# Patient Record
Sex: MALE | Race: OTHER | NOT HISPANIC OR LATINO
[De-identification: names, ages, dates, MRNs, and addresses within clinical notes are randomized per-mention and may not be internally consistent; named-entity substitution may affect disease eponyms.]

---

## 2017-03-07 PROBLEM — Z00.00 ENCOUNTER FOR PREVENTIVE HEALTH EXAMINATION: Status: ACTIVE | Noted: 2017-03-07

## 2019-09-17 ENCOUNTER — APPOINTMENT (OUTPATIENT)
Dept: OTOLARYNGOLOGY | Facility: CLINIC | Age: 74
End: 2019-09-17
Payer: MEDICARE

## 2019-09-17 VITALS
BODY MASS INDEX: 29.12 KG/M2 | HEIGHT: 72 IN | SYSTOLIC BLOOD PRESSURE: 163 MMHG | WEIGHT: 215 LBS | HEART RATE: 56 BPM | DIASTOLIC BLOOD PRESSURE: 87 MMHG

## 2019-09-17 DIAGNOSIS — R25.9 UNSPECIFIED ABNORMAL INVOLUNTARY MOVEMENTS: ICD-10-CM

## 2019-09-17 DIAGNOSIS — J38.7 OTHER DISEASES OF LARYNX: ICD-10-CM

## 2019-09-17 DIAGNOSIS — R49.1 APHONIA: ICD-10-CM

## 2019-09-17 DIAGNOSIS — A92.30 WEST NILE VIRUS INFECTION, UNSPECIFIED: ICD-10-CM

## 2019-09-17 PROCEDURE — 99204 OFFICE O/P NEW MOD 45 MIN: CPT | Mod: 25

## 2019-09-17 PROCEDURE — 31575 DIAGNOSTIC LARYNGOSCOPY: CPT

## 2019-09-17 NOTE — HISTORY OF PRESENT ILLNESS
[Hoarseness] : hoarseness [de-identified] : Mr. Valentin presents reports going to Dr. Vega a month ago for right ear issues and was noted to have hoarseness in his voice.  He reports that his voice has been hoarse for about 3 years ago.  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  Dr. Vega did a fiber optic exam and noted right posterior true vocal cord exophytic erythematous lesion.  No imaging or biopsies done.  Oneil any pain or discomfort..  pt has also noted R ear discomfort w raising voice. no swalow issues. quit smoking 1978   [Difficulty Swallowing] : no difficulty swallowing [Painful Swallowing] : no painful swallowing [Neck Pain] : no neck pain [Weight Loss] : no weight loss

## 2019-09-17 NOTE — CONSULT LETTER
[Dear  ___] : Dear  [unfilled], [Consult Letter:] : I had the pleasure of evaluating your patient, [unfilled]. [Please see my note below.] : Please see my note below. [Consult Closing:] : Thank you very much for allowing me to participate in the care of this patient.  If you have any questions, please do not hesitate to contact me. [Sincerely,] : Sincerely, [FreeTextEntry2] : Berhane Vega MD ([place], [place])\par  [FreeTextEntry3] : Lewis Rogers MD

## 2019-09-17 NOTE — CONSULT LETTER
[Dear  ___] : Dear  [unfilled], [Consult Letter:] : I had the pleasure of evaluating your patient, [unfilled]. [Please see my note below.] : Please see my note below. [Consult Closing:] : Thank you very much for allowing me to participate in the care of this patient.  If you have any questions, please do not hesitate to contact me. [Sincerely,] : Sincerely, [FreeTextEntry2] : Berhane Vega MD ([place], [place])\par  [FreeTextEntry3] : Lewis Rogers MD

## 2019-09-17 NOTE — PROCEDURE
SUBJECTIVE:   CC: Michael Waters is an 45 year old male who presents for preventative health visit.           Chief Complaint   Patient presents with     Physical                                Healthy Habits:    Do you get at least three servings of calcium containing foods daily (dairy, green leafy vegetables, etc.)? yes    Amount of exercise or daily activities, outside of work: 2 day(s) per week    Problems taking medications regularly No    Medication side effects: No    Have you had an eye exam in the past two years? no    Do you see a dentist twice per year? yes    Do you have sleep apnea, excessive snoring or daytime drowsiness?yes       Today's PHQ-2 Score:   PHQ-2 ( 1999 [employer]) 10/2/2012   Q1: Little interest or pleasure in doing things 0   Q2: Feeling down, depressed or hopeless 0   PHQ-2 Score 0       Abuse: Current or Past(Physical, Sexual or Emotional)- No  Do you feel safe in your environment - Yes    Social History   Substance Use Topics     Smoking status: Never Smoker     Smokeless tobacco: Never Used     Alcohol use Yes      Comment: occ      If you drink alcohol do you typically have >3 drinks per day or >7 drinks per week? No                      Last PSA: No results found for: PSA    Reviewed orders with patient. Reviewed health maintenance and updated orders accordingly - Yes    Reviewed and updated as needed this visit by clinical staff         Reviewed and updated as needed this visit by Provider          ROS:  CONSTITUTIONAL: NEGATIVE for fever, chills, change in weight  INTEGUMENTARY/SKIN: NEGATIVE for worrisome rashes, moles or lesions  EYES: NEGATIVE for vision changes or irritation  ENT: NEGATIVE for ear, mouth and throat problems  RESP: NEGATIVE for significant cough or SOB  CV: NEGATIVE for chest pain, palpitations or peripheral edema  GI: NEGATIVE for nausea, abdominal pain, heartburn, or change in bowel habits   male: negative for dysuria, hematuria, decreased urinary stream,  "erectile dysfunction, urethral discharge  MUSCULOSKELETAL: NEGATIVE for significant arthralgias or myalgia  NEURO: NEGATIVE for weakness, dizziness or paresthesias  PSYCHIATRIC: NEGATIVE for changes in mood or affect    OBJECTIVE:   /84  Pulse 92  Temp 98.4  F (36.9  C) (Tympanic)  Resp 16  Ht 5' 11.75\" (1.822 m)  Wt 215 lb (97.5 kg)  BMI 29.36 kg/m2  EXAM:  GENERAL: healthy, alert and no distress  EYES: Eyes grossly normal to inspection, and conjunctivae and sclerae normal  HENT: ear canals and TM's normal, nose and mouth without ulcers or lesions; visibly normal nares without more invasive examination of mucosal surfaces or vasculature  NECK: no adenopathy, no asymmetry, masses, or scars and thyroid normal to palpation  RESP: lungs clear to auscultation - no rales, rhonchi or wheezes  CV: regular rate and rhythm, normal S1 S2, no S3 or S4, no murmur, click or rub, no peripheral edema and peripheral pulses strong  ABDOMEN: soft, nontender, no hepatosplenomegaly, no masses and bowel sounds normal  MS: no gross musculoskeletal defects noted, no edema except:   synovial cyst of left elbow, mobile, non-tender; & right medial 1st MTP bunion/hypertrophy  SKIN: scaly patches of dry skin with scale on tops of feet bilaterally  NEURO: Normal strength and tone, mentation intact and speech normal  PSYCH: mentation appears normal, mood depressed and anxious, otherwise agreeable      ASSESSMENT/PLAN:     10. Routine general medical examination at a health care facility    3. Lipid screening  - Lipid panel reflex to direct LDL Fasting; Future    4. Screening for diabetes mellitus  - **Glucose FUTURE anytime; Future        COUNSELING:  Reviewed preventive health counseling, as reflected in patient instructions       Regular exercise       Healthy diet/nutrition    BP Readings from Last 1 Encounters:   04/25/18 125/83     Estimated body mass index is 27.97 kg/(m^2) as calculated from the following:    Height as of " "10/17/17: 6' 1\" (1.854 m).    Weight as of 4/25/18: 212 lb (96.2 kg).    BP Screening:   Last 3 BP Readings:    BP Readings from Last 3 Encounters:   08/22/18 130/84   04/25/18 125/83   10/17/17 143/89       The following was recommended to the patient:  Re-screen BP within a year and recommended lifestyle modifications  Weight management plan: Discussed healthy diet and exercise guidelines and patient will follow up in 6 months in clinic to re-evaluate.     reports that he has never smoked. He has never used smokeless tobacco.      Counseling Resources:  ATP IV Guidelines  Pooled Cohorts Equation Calculator  FRAX Risk Assessment  ICSI Preventive Guidelines  Dietary Guidelines for Americans, 2010  USDA's MyPlate  ASA Prophylaxis  Lung CA Screening    Rip Garcia MD  [facility]  " [Hoarseness] : hoarseness not clearly evaluated by indirect laryngoscopy [Lesion] : lesion identified by mirror examination needing further evaluation [Topical Lidocaine] : topical lidocaine [Oxymetazoline HCl] : oxymetazoline HCl [Flexible Endoscope] : examined with the flexible endoscope [Serial Number: ___] : Serial Number: [unfilled] [Normal] : normal vallecula [Lesion(s)] : lesion(s) [de-identified] : see above [de-identified] : subtle rd lesion vs hyperemia R arytenoid process/post glottis\par \par nl TVF motion.

## 2019-09-17 NOTE — PROCEDURE
[Hoarseness] : hoarseness not clearly evaluated by indirect laryngoscopy [Lesion] : lesion identified by mirror examination needing further evaluation [Topical Lidocaine] : topical lidocaine [Oxymetazoline HCl] : oxymetazoline HCl [Flexible Endoscope] : examined with the flexible endoscope [Serial Number: ___] : Serial Number: [unfilled] [Normal] : normal vallecula [Lesion(s)] : lesion(s) [de-identified] : subtle rd lesion vs hyperemia R arytenoid process/post glottis\par \par nl TVF motion.  [de-identified] : see above

## 2019-09-17 NOTE — HISTORY OF PRESENT ILLNESS
[Hoarseness] : hoarseness [de-identified] : Mr. Valentin presents reports going to Dr. Vega a month ago for right ear issues and was noted to have hoarseness in his voice.  He reports that his voice has been hoarse for about 3 years ago.  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  Dr. Vega did a fiber optic exam and noted right posterior true vocal cord exophytic erythematous lesion.  No imaging or biopsies done.  Oneil any pain or discomfort..  pt has also noted R ear discomfort w raising voice. no swalow issues. quit smoking 1978   [Difficulty Swallowing] : no difficulty swallowing [Neck Pain] : no neck pain [Painful Swallowing] : no painful swallowing [Weight Loss] : no weight loss

## 2019-09-27 ENCOUNTER — OUTPATIENT (OUTPATIENT)
Dept: OUTPATIENT SERVICES | Facility: HOSPITAL | Age: 74
LOS: 1 days | End: 2019-09-27
Payer: MEDICARE

## 2019-09-27 VITALS
HEIGHT: 71 IN | OXYGEN SATURATION: 96 % | WEIGHT: 218.04 LBS | SYSTOLIC BLOOD PRESSURE: 138 MMHG | RESPIRATION RATE: 14 BRPM | HEART RATE: 68 BPM | DIASTOLIC BLOOD PRESSURE: 84 MMHG | TEMPERATURE: 97 F

## 2019-09-27 DIAGNOSIS — J38.7 OTHER DISEASES OF LARYNX: ICD-10-CM

## 2019-09-27 DIAGNOSIS — Z01.818 ENCOUNTER FOR OTHER PREPROCEDURAL EXAMINATION: ICD-10-CM

## 2019-09-27 DIAGNOSIS — Z90.89 ACQUIRED ABSENCE OF OTHER ORGANS: Chronic | ICD-10-CM

## 2019-09-27 LAB
ANION GAP SERPL CALC-SCNC: 12 MMO/L — SIGNIFICANT CHANGE UP (ref 7–14)
BUN SERPL-MCNC: 20 MG/DL — SIGNIFICANT CHANGE UP (ref 7–23)
CALCIUM SERPL-MCNC: 9.5 MG/DL — SIGNIFICANT CHANGE UP (ref 8.4–10.5)
CHLORIDE SERPL-SCNC: 104 MMOL/L — SIGNIFICANT CHANGE UP (ref 98–107)
CO2 SERPL-SCNC: 27 MMOL/L — SIGNIFICANT CHANGE UP (ref 22–31)
CREAT SERPL-MCNC: 0.91 MG/DL — SIGNIFICANT CHANGE UP (ref 0.5–1.3)
GLUCOSE SERPL-MCNC: 94 MG/DL — SIGNIFICANT CHANGE UP (ref 70–99)
HCT VFR BLD CALC: 40.9 % — SIGNIFICANT CHANGE UP (ref 39–50)
HGB BLD-MCNC: 13 G/DL — SIGNIFICANT CHANGE UP (ref 13–17)
MCHC RBC-ENTMCNC: 28.7 PG — SIGNIFICANT CHANGE UP (ref 27–34)
MCHC RBC-ENTMCNC: 31.8 % — LOW (ref 32–36)
MCV RBC AUTO: 90.3 FL — SIGNIFICANT CHANGE UP (ref 80–100)
NRBC # FLD: 0 K/UL — SIGNIFICANT CHANGE UP (ref 0–0)
PLATELET # BLD AUTO: 215 K/UL — SIGNIFICANT CHANGE UP (ref 150–400)
PMV BLD: 10.7 FL — SIGNIFICANT CHANGE UP (ref 7–13)
POTASSIUM SERPL-MCNC: 4.2 MMOL/L — SIGNIFICANT CHANGE UP (ref 3.5–5.3)
POTASSIUM SERPL-SCNC: 4.2 MMOL/L — SIGNIFICANT CHANGE UP (ref 3.5–5.3)
RBC # BLD: 4.53 M/UL — SIGNIFICANT CHANGE UP (ref 4.2–5.8)
RBC # FLD: 13.2 % — SIGNIFICANT CHANGE UP (ref 10.3–14.5)
SODIUM SERPL-SCNC: 143 MMOL/L — SIGNIFICANT CHANGE UP (ref 135–145)
WBC # BLD: 8.21 K/UL — SIGNIFICANT CHANGE UP (ref 3.8–10.5)
WBC # FLD AUTO: 8.21 K/UL — SIGNIFICANT CHANGE UP (ref 3.8–10.5)

## 2019-09-27 PROCEDURE — 93010 ELECTROCARDIOGRAM REPORT: CPT

## 2019-09-27 RX ORDER — SODIUM CHLORIDE 9 MG/ML
1000 INJECTION, SOLUTION INTRAVENOUS
Refills: 0 | Status: DISCONTINUED | OUTPATIENT
Start: 2019-10-10 | End: 2019-10-25

## 2019-09-27 NOTE — H&P PST ADULT - NEGATIVE ENMT SYMPTOMS
no hearing difficulty/no sinus symptoms/no post-nasal discharge/no throat pain/no dysphagia/no nasal congestion

## 2019-09-27 NOTE — H&P PST ADULT - NSICDXPROBLEM_GEN_ALL_CORE_FT
PROBLEM DIAGNOSES  Problem: Other diseases of larynx  Assessment and Plan: pt scheduled for direct laryngoscopy with biopsy on 10/10/19  Preop instructions provided. Pt verbalized understanding.   Pepcid for GI prophylaxis with written and verbal instruction provided   med eval pending on 10/01/19 as per surgeon request, copy requested

## 2019-09-27 NOTE — H&P PST ADULT - NEGATIVE GENERAL GENITOURINARY SYMPTOMS
no flank pain L/no flank pain R/no bladder infections/no dysuria/no renal colic/no incontinence/no hematuria

## 2019-09-27 NOTE — H&P PST ADULT - HISTORY OF PRESENT ILLNESS
74 y.o. male with hx of west nile fever 3 yeas ago, reports hx of right ear pain, s/p ENT eval, referred to Dr. Rogers, reports vocal cord lesion, c/o hoarseness x 3 years, denies dysphagia, weight loss or fatigue, presents to PST for evaluation for Direct laryngoscopy with Biopsy on 10/10/19 74 y.o. male with hx of west nile fever 3 yeas ago, c/o hoarseness x 3 years, denies dysphagia, weight loss or fatigue, reports right ear pain, s/p ENT eval, vocal cord lesion noted, presents to PST for evaluation for Direct laryngoscopy with Biopsy on 10/10/19

## 2019-09-27 NOTE — H&P PST ADULT - NSANTHOSAYNRD_GEN_A_CORE
No. JOHNSON screening performed.  STOP BANG Legend: 0-2 = LOW Risk; 3-4 = INTERMEDIATE Risk; 5-8 = HIGH Risk

## 2019-10-09 ENCOUNTER — TRANSCRIPTION ENCOUNTER (OUTPATIENT)
Age: 74
End: 2019-10-09

## 2019-10-10 ENCOUNTER — APPOINTMENT (OUTPATIENT)
Dept: OTOLARYNGOLOGY | Facility: HOSPITAL | Age: 74
End: 2019-10-10

## 2019-10-10 ENCOUNTER — OUTPATIENT (OUTPATIENT)
Dept: OUTPATIENT SERVICES | Facility: HOSPITAL | Age: 74
LOS: 1 days | Discharge: HOME | End: 2019-10-10
Payer: MEDICARE

## 2019-10-10 ENCOUNTER — RESULT REVIEW (OUTPATIENT)
Age: 74
End: 2019-10-10

## 2019-10-10 VITALS
HEIGHT: 71 IN | RESPIRATION RATE: 15 BRPM | DIASTOLIC BLOOD PRESSURE: 82 MMHG | HEART RATE: 64 BPM | WEIGHT: 218.04 LBS | TEMPERATURE: 99 F | OXYGEN SATURATION: 98 % | SYSTOLIC BLOOD PRESSURE: 133 MMHG

## 2019-10-10 VITALS
SYSTOLIC BLOOD PRESSURE: 142 MMHG | DIASTOLIC BLOOD PRESSURE: 80 MMHG | HEART RATE: 80 BPM | RESPIRATION RATE: 16 BRPM | OXYGEN SATURATION: 98 %

## 2019-10-10 DIAGNOSIS — J38.7 OTHER DISEASES OF LARYNX: ICD-10-CM

## 2019-10-10 DIAGNOSIS — Z90.89 ACQUIRED ABSENCE OF OTHER ORGANS: Chronic | ICD-10-CM

## 2019-10-10 PROCEDURE — 31536 LARYNGOSCOPY W/BX & OP SCOPE: CPT | Mod: GC,RT

## 2019-10-10 PROCEDURE — 88331 PATH CONSLTJ SURG 1 BLK 1SPC: CPT | Mod: 26

## 2019-10-10 PROCEDURE — 88305 TISSUE EXAM BY PATHOLOGIST: CPT | Mod: 26

## 2019-10-10 RX ORDER — FENTANYL CITRATE 50 UG/ML
25 INJECTION INTRAVENOUS
Refills: 0 | Status: DISCONTINUED | OUTPATIENT
Start: 2019-10-10 | End: 2019-10-10

## 2019-10-10 RX ORDER — FAMOTIDINE 10 MG/ML
1 INJECTION INTRAVENOUS
Qty: 30 | Refills: 1
Start: 2019-10-10

## 2019-10-10 RX ORDER — OXYCODONE HYDROCHLORIDE 5 MG/1
5 TABLET ORAL ONCE
Refills: 0 | Status: DISCONTINUED | OUTPATIENT
Start: 2019-10-10 | End: 2019-10-10

## 2019-10-10 RX ORDER — ONDANSETRON 8 MG/1
4 TABLET, FILM COATED ORAL ONCE
Refills: 0 | Status: DISCONTINUED | OUTPATIENT
Start: 2019-10-10 | End: 2019-10-25

## 2019-10-10 RX ADMIN — OXYCODONE HYDROCHLORIDE 5 MILLIGRAM(S): 5 TABLET ORAL at 22:50

## 2019-10-10 RX ADMIN — FENTANYL CITRATE 25 MICROGRAM(S): 50 INJECTION INTRAVENOUS at 21:30

## 2019-10-10 RX ADMIN — FENTANYL CITRATE 25 MICROGRAM(S): 50 INJECTION INTRAVENOUS at 21:13

## 2019-10-10 RX ADMIN — OXYCODONE HYDROCHLORIDE 5 MILLIGRAM(S): 5 TABLET ORAL at 22:19

## 2019-10-10 NOTE — ASU DISCHARGE PLAN (ADULT/PEDIATRIC) - SPECIFY DIET AND FLUID
soft diet, warm water soft diet, warm water  Do not take pain medication on an empty stomach.  Increase fluids and fiber in diet to prevent constipation.

## 2019-10-10 NOTE — ASU PREOP CHECKLIST - COMMENTS
Took Pepcid this am. Took Pepcid this am., patient took 1 cup of coffee with half/half at 8am. Dr Lee was notified.

## 2019-10-10 NOTE — ASU DISCHARGE PLAN (ADULT/PEDIATRIC) - NURSING INSTRUCTIONS
Cool and warm liquids that are not irritating to the throat should be given for the first day or two. Avoid hot liquids. Avoid citrus juices and milk. Advance at your own pace starting with soft foods and advancing to a regular diet. Avoid rough and scratchy foods and foods that are difficult to chew for approximately 5 days. Avoid strenous exercise and blowing of nose.    When taking pain meds - take with food and know it may cause constipation and nausea - Do NOT drive while on narcotics.

## 2019-10-10 NOTE — BRIEF OPERATIVE NOTE - OPERATION/FINDINGS
s/p direct laryngoscopy with biopsy of right vocal cord lesion  lesion filling right vestibule, not involving anterior commissure or false cord

## 2019-10-10 NOTE — ASU DISCHARGE PLAN (ADULT/PEDIATRIC) - CARE PROVIDER_API CALL
Lewis Rogers)  [facility]; Otolaryngology  [address] [place] [id number]  Phone: [phone]  Fax: [phone]  Follow Up Time:

## 2019-10-14 LAB — SURGICAL PATHOLOGY STUDY: SIGNIFICANT CHANGE UP

## 2019-10-16 PROBLEM — A92.30 WEST NILE VIRUS INFECTION, UNSPECIFIED: Chronic | Status: ACTIVE | Noted: 2019-09-27

## 2019-10-16 PROBLEM — R25.9 UNSPECIFIED ABNORMAL INVOLUNTARY MOVEMENTS: Chronic | Status: ACTIVE | Noted: 2019-09-27

## 2019-10-16 PROBLEM — J38.7 OTHER DISEASES OF LARYNX: Chronic | Status: ACTIVE | Noted: 2019-09-27

## 2019-10-17 ENCOUNTER — FORM ENCOUNTER (OUTPATIENT)
Age: 74
End: 2019-10-17

## 2019-10-18 ENCOUNTER — OUTPATIENT (OUTPATIENT)
Dept: OUTPATIENT SERVICES | Facility: HOSPITAL | Age: 74
LOS: 1 days | End: 2019-10-18
Payer: MEDICARE

## 2019-10-18 ENCOUNTER — APPOINTMENT (OUTPATIENT)
Dept: CT IMAGING | Facility: CLINIC | Age: 74
End: 2019-10-18
Payer: MEDICARE

## 2019-10-18 DIAGNOSIS — Z90.89 ACQUIRED ABSENCE OF OTHER ORGANS: Chronic | ICD-10-CM

## 2019-10-18 DIAGNOSIS — C32.9 MALIGNANT NEOPLASM OF LARYNX, UNSPECIFIED: ICD-10-CM

## 2019-10-18 PROCEDURE — 70491 CT SOFT TISSUE NECK W/DYE: CPT | Mod: 26

## 2019-10-18 PROCEDURE — 70491 CT SOFT TISSUE NECK W/DYE: CPT

## 2019-10-21 ENCOUNTER — OUTPATIENT (OUTPATIENT)
Dept: OUTPATIENT SERVICES | Facility: HOSPITAL | Age: 74
LOS: 1 days | Discharge: HOME | End: 2019-10-21
Payer: MEDICARE

## 2019-10-21 DIAGNOSIS — Z90.89 ACQUIRED ABSENCE OF OTHER ORGANS: Chronic | ICD-10-CM

## 2019-10-22 ENCOUNTER — APPOINTMENT (OUTPATIENT)
Dept: RADIATION ONCOLOGY | Facility: CLINIC | Age: 74
End: 2019-10-22
Payer: MEDICARE

## 2019-10-22 ENCOUNTER — APPOINTMENT (OUTPATIENT)
Dept: OTOLARYNGOLOGY | Facility: CLINIC | Age: 74
End: 2019-10-22
Payer: MEDICARE

## 2019-10-22 VITALS
WEIGHT: 217 LBS | OXYGEN SATURATION: 100 % | HEART RATE: 62 BPM | RESPIRATION RATE: 16 BRPM | BODY MASS INDEX: 29.39 KG/M2 | TEMPERATURE: 36.7 F | SYSTOLIC BLOOD PRESSURE: 136 MMHG | DIASTOLIC BLOOD PRESSURE: 73 MMHG | HEIGHT: 72 IN

## 2019-10-22 VITALS
DIASTOLIC BLOOD PRESSURE: 76 MMHG | HEART RATE: 78 BPM | BODY MASS INDEX: 29.12 KG/M2 | RESPIRATION RATE: 16 BRPM | SYSTOLIC BLOOD PRESSURE: 115 MMHG | WEIGHT: 215 LBS | HEIGHT: 72 IN

## 2019-10-22 DIAGNOSIS — Z87.891 PERSONAL HISTORY OF NICOTINE DEPENDENCE: ICD-10-CM

## 2019-10-22 PROCEDURE — 99214 OFFICE O/P EST MOD 30 MIN: CPT | Mod: 25

## 2019-10-22 PROCEDURE — 31575 DIAGNOSTIC LARYNGOSCOPY: CPT

## 2019-10-22 PROCEDURE — 99204 OFFICE O/P NEW MOD 45 MIN: CPT | Mod: 25

## 2019-10-22 PROCEDURE — 77263 THER RADIOLOGY TX PLNG CPLX: CPT

## 2019-10-22 NOTE — PHYSICAL EXAM
[Midline] : trachea located in midline position [Normal] : no rashes [FreeTextEntry1] : Hoarse voice [Laryngoscopy Performed] : laryngoscopy was performed, see procedure section for findings

## 2019-10-22 NOTE — PROCEDURE
[Unable to Cooperate with Mirror] : patient unable to cooperate with mirror [Hoarseness] : hoarseness not clearly evaluated by indirect laryngoscopy [Topical Lidocaine] : topical lidocaine [Lesion] : lesion identified by mirror examination needing further evaluation [Oxymetazoline HCl] : oxymetazoline HCl [Flexible Endoscope] : examined with the flexible endoscope [Serial Number: ___] : Serial Number: [unfilled] [Lesion(s)] : lesion(s) [True Vocal Cords Paralysis] : no true vocal cord paralysis [True Vocal Cords Erythematous] : true vocal cord edema on the right [Glottis Arytenoid Cartilages] : no arytenoid granulomas [Glottis Arytenoid Cartilages Erythema] : no arytenoid erythema [Normal] : posterior cricoid area had healthy pink mucosa in the interarytenoid area and the esophageal inlet [Present] : absent [de-identified] : R ventricle lesion with thickening of the RVC

## 2019-10-22 NOTE — REASON FOR VISIT
[Head and Neck Cancer] : head and neck cancer [Consideration of Curative Therapy] : consideration of curative therapy for

## 2019-10-22 NOTE — HISTORY OF PRESENT ILLNESS
[de-identified] : Mr. Valentin is a 74 year old male s/p laryngeal biopsy for SCCa glottis with Dr. Rogers on October 10, 2019.  He reports that his voice has somewhat improved since he was last seen in our office prior to the biopsy.  He presents for possible robotic surgery.  Denies any pain, discomfort, or dysphagia. [Neck Mass] : no neck mass [Difficulty Swallowing] : no difficulty swallowing [Painful Swallowing] : no painful swallowing

## 2019-10-22 NOTE — ASSESSMENT
[FreeTextEntry1] : While pt's lesion appears to be resectable robotically, it would require at least a Type III r vocal cordectomy.  His voice outcome after a Type III cordectomy would be inferior to the voice outcome with radiation.  Pt to proceed with the evaluation with radiation therapy later today.

## 2019-10-23 NOTE — HISTORY OF PRESENT ILLNESS
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 \par \par c/o voice change, then saw ENT physician. Biopsy pathology in 10/2019 showed glottis SCC. Denies dysphagia, neck pain, dry mouth. \par \par CT scan in 10/2019 IMPRESSION: \par A poorly defined enhancing lesion involves the posterior aspect of the right true vocal cord. There is no subglottic extension or extralaryngeal spread of disease. The overlying thyroid cartilage and overlying strap musculature is unremarkable. \par No evidence for enlarged cervical lymph nodes. \par \par Side effects of radiation therapy to the head and neck were discussed with patient, including but not limited to skin reaction, fatigue, difficulty swallowing, feeding tube dependent, change in appetite and taste, nausea, bone marrow suppression, cough or shortness of breath. Patient is referred to speech and swallowing evaluation, nutritionist and .

## 2019-10-23 NOTE — PHYSICAL EXAM
[Normal] : oriented to person, place and time, the affect was normal, the mood was normal and not anxious [de-identified] : FOE: R VC lesion, mobile

## 2019-11-01 PROCEDURE — 77334 RADIATION TREATMENT AID(S): CPT | Mod: 26

## 2019-11-14 ENCOUNTER — APPOINTMENT (OUTPATIENT)
Dept: SPEECH THERAPY | Facility: CLINIC | Age: 74
End: 2019-11-14
Payer: MEDICARE

## 2019-11-14 PROCEDURE — 92610 EVALUATE SWALLOWING FUNCTION: CPT | Mod: GN

## 2019-12-03 ENCOUNTER — FORM ENCOUNTER (OUTPATIENT)
Age: 74
End: 2019-12-03

## 2019-12-03 PROCEDURE — 77301 RADIOTHERAPY DOSE PLAN IMRT: CPT | Mod: 26

## 2019-12-03 PROCEDURE — 77300 RADIATION THERAPY DOSE PLAN: CPT | Mod: 26

## 2019-12-03 PROCEDURE — 77338 DESIGN MLC DEVICE FOR IMRT: CPT | Mod: 26

## 2019-12-04 ENCOUNTER — APPOINTMENT (OUTPATIENT)
Dept: MRI IMAGING | Facility: IMAGING CENTER | Age: 74
End: 2019-12-04
Payer: MEDICARE

## 2019-12-04 ENCOUNTER — OUTPATIENT (OUTPATIENT)
Dept: OUTPATIENT SERVICES | Facility: HOSPITAL | Age: 74
LOS: 1 days | End: 2019-12-04
Payer: MEDICARE

## 2019-12-04 DIAGNOSIS — C32.9 MALIGNANT NEOPLASM OF LARYNX, UNSPECIFIED: ICD-10-CM

## 2019-12-04 DIAGNOSIS — Z90.89 ACQUIRED ABSENCE OF OTHER ORGANS: Chronic | ICD-10-CM

## 2019-12-04 PROCEDURE — 70543 MRI ORBT/FAC/NCK W/O &W/DYE: CPT | Mod: 26

## 2019-12-04 PROCEDURE — 70543 MRI ORBT/FAC/NCK W/O &W/DYE: CPT

## 2019-12-04 PROCEDURE — A9585: CPT

## 2019-12-09 PROCEDURE — 77427 RADIATION TX MANAGEMENT X5: CPT

## 2019-12-09 PROCEDURE — 77387B: CUSTOM | Mod: 26

## 2019-12-10 PROCEDURE — 77387B: CUSTOM | Mod: 26

## 2019-12-11 VITALS — RESPIRATION RATE: 16 BRPM | BODY MASS INDEX: 29.39 KG/M2 | HEIGHT: 72 IN | WEIGHT: 217 LBS

## 2019-12-11 PROCEDURE — 77387B: CUSTOM | Mod: 26

## 2019-12-11 NOTE — REVIEW OF SYSTEMS
[Dysphagia: Grade 0] : Dysphagia: Grade 0 [Tinnitus - Grade 0] : Tinnitus - Grade 0 [Blurred Vision: Grade 0] : Blurred Vision: Grade 0 [Mucositis Oral: Grade 0] : Mucositis Oral: Grade 0  [Xerostomia: Grade 0] : Xerostomia: Grade 0 [Oral Pain: Grade 0] : Oral Pain: Grade 0 [Salivary duct inflammation: Grade 0] : Salivary duct inflammation: Grade 0 [Dysgeusia: Grade 0] : Dysgeusia: Grade 0 [Alopecia: Grade 0] : Alopecia: Grade 0 [Pruritus: Grade 0] : Pruritus: Grade 0 [Skin Atrophy: Grade 0] : Skin Atrophy: Grade 0 [Skin Hyperpigmentation: Grade 0] : Skin Hyperpigmentation: Grade 0 [Skin Induration: Grade 0] : Skin Induration: Grade 0 [Dermatitis Radiation: Grade 0] : Dermatitis Radiation: Grade 0

## 2019-12-12 PROCEDURE — 77387B: CUSTOM | Mod: 26

## 2019-12-13 PROCEDURE — 77014: CPT | Mod: 26

## 2019-12-16 PROCEDURE — 77427 RADIATION TX MANAGEMENT X5: CPT

## 2019-12-16 PROCEDURE — 77387B: CUSTOM | Mod: 26

## 2019-12-17 PROCEDURE — 77387B: CUSTOM | Mod: 26

## 2019-12-18 PROCEDURE — 77387B: CUSTOM | Mod: 26

## 2019-12-19 VITALS
DIASTOLIC BLOOD PRESSURE: 85 MMHG | TEMPERATURE: 97.7 F | WEIGHT: 224.1 LBS | OXYGEN SATURATION: 100 % | RESPIRATION RATE: 16 BRPM | HEART RATE: 60 BPM | HEIGHT: 72 IN | SYSTOLIC BLOOD PRESSURE: 162 MMHG | BODY MASS INDEX: 30.35 KG/M2

## 2019-12-19 PROCEDURE — 77387B: CUSTOM | Mod: 26

## 2019-12-19 NOTE — REASON FOR VISIT
[Routine On-Treatment] : a routine on-treatment visit for [Head and Neck Cancer] : head and neck cancer Multiple episodes of SOB occurring at rest, spontaneous onset and resolution./intermittent intermittent

## 2019-12-20 LAB
ALBUMIN SERPL ELPH-MCNC: 4.2 G/DL
ALP BLD-CCNC: 66 U/L
ALT SERPL-CCNC: 7 U/L
ANION GAP SERPL CALC-SCNC: 11 MMOL/L
AST SERPL-CCNC: 13 U/L
BASOPHILS # BLD AUTO: 0.06 K/UL
BASOPHILS NFR BLD AUTO: 0.8 %
BILIRUB SERPL-MCNC: 0.3 MG/DL
BUN SERPL-MCNC: 22 MG/DL
CALCIUM SERPL-MCNC: 9.2 MG/DL
CHLORIDE SERPL-SCNC: 102 MMOL/L
CO2 SERPL-SCNC: 28 MMOL/L
CREAT SERPL-MCNC: 1 MG/DL
EOSINOPHIL # BLD AUTO: 0.28 K/UL
EOSINOPHIL NFR BLD AUTO: 3.5 %
GLUCOSE SERPL-MCNC: 112 MG/DL
HCT VFR BLD CALC: 37.7 %
HGB BLD-MCNC: 12.1 G/DL
IMM GRANULOCYTES NFR BLD AUTO: 0.4 %
LYMPHOCYTES # BLD AUTO: 1.8 K/UL
LYMPHOCYTES NFR BLD AUTO: 22.7 %
MAN DIFF?: NORMAL
MCHC RBC-ENTMCNC: 29.4 PG
MCHC RBC-ENTMCNC: 32.1 GM/DL
MCV RBC AUTO: 91.5 FL
MONOCYTES # BLD AUTO: 0.57 K/UL
MONOCYTES NFR BLD AUTO: 7.2 %
NEUTROPHILS # BLD AUTO: 5.18 K/UL
NEUTROPHILS NFR BLD AUTO: 65.4 %
PLATELET # BLD AUTO: 251 K/UL
POTASSIUM SERPL-SCNC: 4.3 MMOL/L
PROT SERPL-MCNC: 6.7 G/DL
RBC # BLD: 4.12 M/UL
RBC # FLD: 12.8 %
SODIUM SERPL-SCNC: 141 MMOL/L
WBC # FLD AUTO: 7.92 K/UL

## 2019-12-20 PROCEDURE — 77014: CPT | Mod: 26

## 2019-12-20 NOTE — HISTORY OF PRESENT ILLNESS
[FreeTextEntry1] : 75 y/o male with h/o Parkinson disease, newly diagnosed glottis SCC presents for consideration of radiation treatment. \par \par c/o voice change, then saw ENT physician. Biopsy pathology in 10/2019 showed glottis SCC. \par \par 3/28 fractions of RT today. Denies dysphagia, neck pain, dry mouth. No chemo

## 2019-12-20 NOTE — DISEASE MANAGEMENT
[Clinical] : TNM Stage: c [I] : I [TTNM] : 1 [NTNM] : 0 [de-identified] : 7070 cGy [MTNM] : 0 [de-identified] : glottis

## 2019-12-23 PROCEDURE — 77427 RADIATION TX MANAGEMENT X5: CPT

## 2019-12-23 PROCEDURE — 77387B: CUSTOM | Mod: 26

## 2019-12-24 PROCEDURE — 77387B: CUSTOM | Mod: 26

## 2019-12-26 VITALS — BODY MASS INDEX: 30.43 KG/M2 | WEIGHT: 224.65 LBS | HEIGHT: 72 IN | RESPIRATION RATE: 16 BRPM

## 2019-12-26 PROCEDURE — 77387B: CUSTOM | Mod: 26

## 2019-12-26 NOTE — REVIEW OF SYSTEMS
[Dysphagia: Grade 0] : Dysphagia: Grade 0 [Tinnitus - Grade 0] : Tinnitus - Grade 0 [Blurred Vision: Grade 0] : Blurred Vision: Grade 0 [Mucositis Oral: Grade 0] : Mucositis Oral: Grade 0  [Oral Pain: Grade 0] : Oral Pain: Grade 0 [Xerostomia: Grade 0] : Xerostomia: Grade 0 [Salivary duct inflammation: Grade 0] : Salivary duct inflammation: Grade 0 [Dysgeusia: Grade 0] : Dysgeusia: Grade 0 [Pruritus: Grade 0] : Pruritus: Grade 0 [Alopecia: Grade 0] : Alopecia: Grade 0 [Skin Induration: Grade 0] : Skin Induration: Grade 0 [Skin Hyperpigmentation: Grade 0] : Skin Hyperpigmentation: Grade 0 [Skin Atrophy: Grade 0] : Skin Atrophy: Grade 0 [Dermatitis Radiation: Grade 0] : Dermatitis Radiation: Grade 0

## 2019-12-27 LAB
ALBUMIN SERPL ELPH-MCNC: 4.3 G/DL
ALP BLD-CCNC: 78 U/L
ALT SERPL-CCNC: 12 U/L
ANION GAP SERPL CALC-SCNC: 11 MMOL/L
AST SERPL-CCNC: 17 U/L
BASOPHILS # BLD AUTO: 0.06 K/UL
BASOPHILS NFR BLD AUTO: 0.5 %
BILIRUB SERPL-MCNC: 0.3 MG/DL
BUN SERPL-MCNC: 24 MG/DL
CALCIUM SERPL-MCNC: 9.2 MG/DL
CHLORIDE SERPL-SCNC: 102 MMOL/L
CO2 SERPL-SCNC: 27 MMOL/L
CREAT SERPL-MCNC: 1.01 MG/DL
EOSINOPHIL # BLD AUTO: 0.32 K/UL
EOSINOPHIL NFR BLD AUTO: 2.9 %
GLUCOSE SERPL-MCNC: 100 MG/DL
HCT VFR BLD CALC: 38.4 %
HGB BLD-MCNC: 12.3 G/DL
IMM GRANULOCYTES NFR BLD AUTO: 0.5 %
LYMPHOCYTES # BLD AUTO: 1.93 K/UL
LYMPHOCYTES NFR BLD AUTO: 17.6 %
MAN DIFF?: NORMAL
MCHC RBC-ENTMCNC: 29.2 PG
MCHC RBC-ENTMCNC: 32 GM/DL
MCV RBC AUTO: 91.2 FL
MONOCYTES # BLD AUTO: 0.8 K/UL
MONOCYTES NFR BLD AUTO: 7.3 %
NEUTROPHILS # BLD AUTO: 7.81 K/UL
NEUTROPHILS NFR BLD AUTO: 71.2 %
PLATELET # BLD AUTO: 230 K/UL
POTASSIUM SERPL-SCNC: 5.1 MMOL/L
PROT SERPL-MCNC: 6.9 G/DL
RBC # BLD: 4.21 M/UL
RBC # FLD: 12.7 %
SODIUM SERPL-SCNC: 140 MMOL/L
WBC # FLD AUTO: 10.97 K/UL

## 2019-12-27 PROCEDURE — 77014: CPT | Mod: 26

## 2019-12-27 NOTE — HISTORY OF PRESENT ILLNESS
[FreeTextEntry1] : 75 y/o male with h/o Parkinson disease, newly diagnosed glottis SCC presents for consideration of radiation treatment. \par \par c/o voice change, then saw ENT physician. Biopsy pathology in 10/2019 showed glottis SCC. \par \par 9/28 fractions of RT today. Denies dysphagia, neck pain, dry mouth. No chemo. Blood test every 7 - 10 days.

## 2019-12-27 NOTE — DISEASE MANAGEMENT
[Clinical] : TNM Stage: c [I] : I [TTNM] : 1 [NTNM] : 0 [MTNM] : 0 [de-identified] : glottis [de-identified] : 1590 cGy

## 2019-12-30 VITALS
TEMPERATURE: 98.6 F | RESPIRATION RATE: 16 BRPM | HEART RATE: 60 BPM | OXYGEN SATURATION: 100 % | HEIGHT: 72 IN | DIASTOLIC BLOOD PRESSURE: 85 MMHG | SYSTOLIC BLOOD PRESSURE: 148 MMHG

## 2019-12-30 PROCEDURE — 77387B: CUSTOM | Mod: 26

## 2019-12-30 NOTE — REVIEW OF SYSTEMS
[Dysphagia: Grade 0] : Dysphagia: Grade 0 [Tinnitus - Grade 0] : Tinnitus - Grade 0 [Mucositis Oral: Grade 0] : Mucositis Oral: Grade 0  [Blurred Vision: Grade 0] : Blurred Vision: Grade 0 [Xerostomia: Grade 0] : Xerostomia: Grade 0 [Oral Pain: Grade 0] : Oral Pain: Grade 0 [Salivary duct inflammation: Grade 0] : Salivary duct inflammation: Grade 0 [Alopecia: Grade 0] : Alopecia: Grade 0 [Dysgeusia: Grade 0] : Dysgeusia: Grade 0 [Pruritus: Grade 0] : Pruritus: Grade 0 [Skin Atrophy: Grade 0] : Skin Atrophy: Grade 0 [Skin Hyperpigmentation: Grade 1 - Hyperpigmentation covering <10% BSA; no psychosocial impact] : Skin Hyperpigmentation: Grade 1 - Hyperpigmentation covering <10% BSA; no psychosocial impact [Skin Induration: Grade 0] : Skin Induration: Grade 0 [Dermatitis Radiation: Grade 1 - Faint erythema or dry desquamation] : Dermatitis Radiation: Grade 1 - Faint erythema or dry desquamation

## 2019-12-31 ENCOUNTER — OTHER (OUTPATIENT)
Age: 74
End: 2019-12-31

## 2019-12-31 PROCEDURE — 77387B: CUSTOM | Mod: 26

## 2020-01-02 PROCEDURE — 77427 RADIATION TX MANAGEMENT X5: CPT

## 2020-01-02 PROCEDURE — 77387B: CUSTOM | Mod: 26

## 2020-01-03 PROCEDURE — 77014: CPT | Mod: 26

## 2020-01-06 PROCEDURE — 77387B: CUSTOM | Mod: 26

## 2020-01-07 PROCEDURE — 77387B: CUSTOM | Mod: 26

## 2020-01-08 PROCEDURE — 77387B: CUSTOM | Mod: 26

## 2020-01-08 NOTE — HISTORY OF PRESENT ILLNESS
[FreeTextEntry1] : 73 y/o male with h/o Parkinson disease, newly diagnosed glottis SCC presents for consideration of radiation treatment. \par \par c/o voice change, then saw ENT physician. Biopsy pathology in 10/2019 showed glottis SCC. \par \par 13/28 fractions of RT today. Reports odynophagia. and mild dysphagia,.  Denies neck pain, dry mouth. No chemo. Blood test will be drawn today.\par \par want to hold of on trying magic mouth wash. will use aloe vera

## 2020-01-08 NOTE — DISEASE MANAGEMENT
[Clinical] : TNM Stage: c [I] : I [TTNM] : 1 [MTNM] : 0 [NTNM] : 0 [de-identified] : glottis [de-identified] : 0180 cGy

## 2020-01-08 NOTE — DISEASE MANAGEMENT
[Clinical] : TNM Stage: c [I] : I [TTNM] : 1 [NTNM] : 0 [MTNM] : 0 [de-identified] : 0520 cGy [de-identified] : glottis

## 2020-01-08 NOTE — PHYSICAL EXAM
[Normal] : the ears, nose and oropharynx were normal in appearance [de-identified] : grade 1 dermatitis bilateral neck

## 2020-01-08 NOTE — HISTORY OF PRESENT ILLNESS
[FreeTextEntry1] : 75 y/o male with h/o Parkinson disease, newly diagnosed glottis SCC presents for radiation treatment. \par \par c/o voice change, then saw ENT physician. Biopsy pathology in 10/2019 showed glottis SCC. \par \par 15/28 fractions of RT today. Reports odynophagia. and mild dysphagia,.  Denies neck pain, dry mouth. No chemo. Blood test will be monitored weekly\par \par Pt wants to hold of on trying magic mouth wash. will use aloe vera

## 2020-01-09 VITALS — RESPIRATION RATE: 16 BRPM | BODY MASS INDEX: 30.4 KG/M2 | WEIGHT: 224.43 LBS | HEIGHT: 72 IN

## 2020-01-09 PROCEDURE — 77427 RADIATION TX MANAGEMENT X5: CPT

## 2020-01-09 PROCEDURE — 77387B: CUSTOM | Mod: 26

## 2020-01-09 NOTE — VITALS
[Maximal Pain Intensity: 0/10] : 0/10 [80: Normal activity with effort; some signs or symptoms of disease.] : 80: Normal activity with effort; some signs or symptoms of disease.  [Least Pain Intensity: 0/10] : 0/10 [ECOG Performance Status: 2 - Ambulatory and capable of all self care but unable to carry out any work activities] : Performance Status: 2 - Ambulatory and capable of all self care but unable to carry out any work activities. Up and about more than 50% of waking hours

## 2020-01-09 NOTE — REVIEW OF SYSTEMS
[Dysphagia: Grade 0] : Dysphagia: Grade 0 [Blurred Vision: Grade 0] : Blurred Vision: Grade 0 [Tinnitus - Grade 0] : Tinnitus - Grade 0 [Xerostomia: Grade 0] : Xerostomia: Grade 0 [Mucositis Oral: Grade 0] : Mucositis Oral: Grade 0  [Oral Pain: Grade 0] : Oral Pain: Grade 0 [Salivary duct inflammation: Grade 0] : Salivary duct inflammation: Grade 0 [Dysgeusia: Grade 0] : Dysgeusia: Grade 0 [Alopecia: Grade 0] : Alopecia: Grade 0 [Pruritus: Grade 0] : Pruritus: Grade 0 [Skin Hyperpigmentation: Grade 1 - Hyperpigmentation covering <10% BSA; no psychosocial impact] : Skin Hyperpigmentation: Grade 1 - Hyperpigmentation covering <10% BSA; no psychosocial impact [Skin Atrophy: Grade 0] : Skin Atrophy: Grade 0 [Dermatitis Radiation: Grade 1 - Faint erythema or dry desquamation] : Dermatitis Radiation: Grade 1 - Faint erythema or dry desquamation [Skin Induration: Grade 0] : Skin Induration: Grade 0

## 2020-01-10 PROCEDURE — 77014: CPT | Mod: 26

## 2020-01-13 PROCEDURE — 77387B: CUSTOM | Mod: 26

## 2020-01-14 PROCEDURE — 77387B: CUSTOM | Mod: 26

## 2020-01-15 PROCEDURE — 77387B: CUSTOM | Mod: 26

## 2020-01-16 VITALS — RESPIRATION RATE: 16 BRPM | WEIGHT: 215 LBS | BODY MASS INDEX: 29.12 KG/M2 | HEIGHT: 72 IN

## 2020-01-16 PROCEDURE — 77387B: CUSTOM | Mod: 26

## 2020-01-16 NOTE — REVIEW OF SYSTEMS
[Tinnitus - Grade 0] : Tinnitus - Grade 0 [Dysphagia: Grade 0] : Dysphagia: Grade 0 [Blurred Vision: Grade 0] : Blurred Vision: Grade 0 [Mucositis Oral: Grade 0] : Mucositis Oral: Grade 0  [Xerostomia: Grade 1 - Symptomatic (e.g., dry or thick saliva) without significant dietary alteration; unstimulated saliva flow >0.2 ml/min] : Xerostomia: Grade 1 - Symptomatic (e.g., dry or thick saliva) without significant dietary alteration; unstimulated saliva flow >0.2 ml/min [Oral Pain: Grade 0] : Oral Pain: Grade 0 [Salivary duct inflammation: Grade 0] : Salivary duct inflammation: Grade 0 [Dysgeusia: Grade 0] : Dysgeusia: Grade 0 [Hoarseness: Grade 1 - Mild or intermittent voice change; fully understandable; self-resolves] : Hoarseness: Grade 1 - Mild or intermittent voice change; fully understandable; self-resolves [Cough: Grade 1 - Mild symptoms; nonprescription intervention indicated] : Cough: Grade 1 - Mild symptoms; nonprescription intervention indicated [Alopecia: Grade 0] : Alopecia: Grade 0 [Pruritus: Grade 0] : Pruritus: Grade 0 [Skin Atrophy: Grade 0] : Skin Atrophy: Grade 0 [Skin Hyperpigmentation: Grade 1 - Hyperpigmentation covering <10% BSA; no psychosocial impact] : Skin Hyperpigmentation: Grade 1 - Hyperpigmentation covering <10% BSA; no psychosocial impact [Dermatitis Radiation: Grade 1 - Faint erythema or dry desquamation] : Dermatitis Radiation: Grade 1 - Faint erythema or dry desquamation [Skin Induration: Grade 0] : Skin Induration: Grade 0

## 2020-01-16 NOTE — REVIEW OF SYSTEMS
[Tinnitus - Grade 0] : Tinnitus - Grade 0 [Dysphagia: Grade 0] : Dysphagia: Grade 0 [Mucositis Oral: Grade 0] : Mucositis Oral: Grade 0  [Blurred Vision: Grade 0] : Blurred Vision: Grade 0 [Oral Pain: Grade 0] : Oral Pain: Grade 0 [Xerostomia: Grade 1 - Symptomatic (e.g., dry or thick saliva) without significant dietary alteration; unstimulated saliva flow >0.2 ml/min] : Xerostomia: Grade 1 - Symptomatic (e.g., dry or thick saliva) without significant dietary alteration; unstimulated saliva flow >0.2 ml/min [Dysgeusia: Grade 0] : Dysgeusia: Grade 0 [Salivary duct inflammation: Grade 0] : Salivary duct inflammation: Grade 0 [Cough: Grade 1 - Mild symptoms; nonprescription intervention indicated] : Cough: Grade 1 - Mild symptoms; nonprescription intervention indicated [Hoarseness: Grade 1 - Mild or intermittent voice change; fully understandable; self-resolves] : Hoarseness: Grade 1 - Mild or intermittent voice change; fully understandable; self-resolves [Alopecia: Grade 0] : Alopecia: Grade 0 [Pruritus: Grade 0] : Pruritus: Grade 0 [Skin Hyperpigmentation: Grade 1 - Hyperpigmentation covering <10% BSA; no psychosocial impact] : Skin Hyperpigmentation: Grade 1 - Hyperpigmentation covering <10% BSA; no psychosocial impact [Skin Atrophy: Grade 0] : Skin Atrophy: Grade 0 [Dermatitis Radiation: Grade 1 - Faint erythema or dry desquamation] : Dermatitis Radiation: Grade 1 - Faint erythema or dry desquamation [Skin Induration: Grade 0] : Skin Induration: Grade 0

## 2020-01-16 NOTE — VITALS
[Least Pain Intensity: 0/10] : 0/10 [Maximal Pain Intensity: 3/10] : 3/10 [Pain Description/Quality: ___] : Pain description/quality: [unfilled] [Pain Duration: ___] : Pain duration: [unfilled] [OTC] : OTC [Pain Location: ___] : Pain Location: [unfilled] [80: Normal activity with effort; some signs or symptoms of disease.] : 80: Normal activity with effort; some signs or symptoms of disease.  [ECOG Performance Status: 2 - Ambulatory and capable of all self care but unable to carry out any work activities] : Performance Status: 2 - Ambulatory and capable of all self care but unable to carry out any work activities. Up and about more than 50% of waking hours

## 2020-01-17 PROCEDURE — 77014: CPT | Mod: 26

## 2020-01-28 NOTE — DISEASE MANAGEMENT
[Clinical] : TNM Stage: c [I] : I [TTNM] : 1 [NTNM] : 0 [MTNM] : 0 [de-identified] : 9630 cGy [de-identified] : glottis

## 2020-01-28 NOTE — HISTORY OF PRESENT ILLNESS
[FreeTextEntry1] : 73 y/o male with h/o Parkinson disease, newly diagnosed glottis SCC presents for radiation treatment. \par \par c/o voice change, then saw ENT physician. Biopsy pathology in 10/2019 showed glottis SCC. \par \par 27/28 fractions of RT today. Reports odynophagia and dysphagia are getting better even he does not take any meds.  c/o dry cough, lot of mucus in throat. Denies neck pain, fever. Not on chemo. Declined Rx for cough. Pt wants to hold of on trying magic mouth wash. will use aloe vera

## 2020-01-28 NOTE — DISEASE MANAGEMENT
[Clinical] : TNM Stage: c [I] : I [TTNM] : 1 [NTNM] : 0 [de-identified] : 0940 cGy [MTNM] : 0 [de-identified] : glottis

## 2020-01-28 NOTE — DISEASE MANAGEMENT
[Clinical] : TNM Stage: c [I] : I [TTNM] : 1 [NTNM] : 0 [MTNM] : 0 [de-identified] : 4480 cGy [de-identified] : glottis

## 2020-01-28 NOTE — HISTORY OF PRESENT ILLNESS
[FreeTextEntry1] : 75 y/o male with h/o Parkinson disease, newly diagnosed glottis SCC presents for radiation treatment. \par \par c/o voice change, then saw ENT physician. Biopsy pathology in 10/2019 showed glottis SCC. \par \par 27/28 fractions of RT today. Reports odynophagia and dysphagia are getting better even he does not take any meds.  c/o dry cough, lot of mucus in throat. Denies neck pain, fever. Not on chemo. Declined Rx for cough. Pt wants to hold of on trying magic mouth wash. will use aloe vera

## 2020-01-28 NOTE — HISTORY OF PRESENT ILLNESS
[FreeTextEntry1] : 75 y/o male with h/o Parkinson disease, newly diagnosed glottis SCC presents for radiation treatment. \par \par c/o voice change, then saw ENT physician. Biopsy pathology in 10/2019 showed glottis SCC. \par \par 22/28 fractions of RT today. Reports odynophagia and dysphagia are getting better even he does not take any meds.  Denies neck pain, dry mouth. No chemo. \par \par Pt wants to hold of on trying magic mouth wash. will use aloe vera

## 2020-02-25 ENCOUNTER — APPOINTMENT (OUTPATIENT)
Dept: RADIATION ONCOLOGY | Facility: CLINIC | Age: 75
End: 2020-02-25
Payer: MEDICARE

## 2020-02-25 VITALS
OXYGEN SATURATION: 100 % | BODY MASS INDEX: 29.35 KG/M2 | RESPIRATION RATE: 16 BRPM | TEMPERATURE: 97.6 F | SYSTOLIC BLOOD PRESSURE: 138 MMHG | WEIGHT: 216.38 LBS | DIASTOLIC BLOOD PRESSURE: 82 MMHG | HEART RATE: 60 BPM

## 2020-02-25 PROCEDURE — 99024 POSTOP FOLLOW-UP VISIT: CPT | Mod: GC

## 2020-02-27 NOTE — HISTORY OF PRESENT ILLNESS
[FreeTextEntry1] : Piyush Valentin is a 73 y/o male without significant PMH and T1N0 glottis SCC who received 63 Gy/28fx from 12/9/19-1/17/20.  \par \par He initially presented with c/o voice change, then saw ENT physician. Biopsy pathology in 10/2019 showed glottis SCC. Denies dysphagia, neck pain, dry mouth. \par \par CT scan in 10/2019 IMPRESSION: \par A poorly defined enhancing lesion involves the posterior aspect of the right true vocal cord. There is no subglottic extension or extralaryngeal spread of disease. The overlying thyroid cartilage and overlying strap musculature is unremarkable. \par No evidence for enlarged cervical lymph nodes. \par \par Doing well. No change in taste, swallowing well, no pain, eating well/no change in weight. Voice has improved. No issues at this time. Appointment with Dr. Garduno next week.

## 2020-02-27 NOTE — REVIEW OF SYSTEMS
[Negative] : Allergic/Immunologic [Dysphagia: Grade 0] : Dysphagia: Grade 0 [Tinnitus - Grade 0] : Tinnitus - Grade 0 [Mucositis Oral: Grade 0] : Mucositis Oral: Grade 0  [Xerostomia: Grade 0] : Xerostomia: Grade 0 [Blurred Vision: Grade 0] : Blurred Vision: Grade 0 [Salivary duct inflammation: Grade 0] : Salivary duct inflammation: Grade 0 [Oral Pain: Grade 0] : Oral Pain: Grade 0 [Dysgeusia: Grade 0] : Dysgeusia: Grade 0 [Pruritus: Grade 0] : Pruritus: Grade 0 [Alopecia: Grade 0] : Alopecia: Grade 0 [Skin Induration: Grade 0] : Skin Induration: Grade 0 [Skin Atrophy: Grade 0] : Skin Atrophy: Grade 0 [Skin Hyperpigmentation: Grade 0] : Skin Hyperpigmentation: Grade 0 [Dermatitis Radiation: Grade 0] : Dermatitis Radiation: Grade 0 [FreeTextEntry4] : h/o radiation 1/2020

## 2020-03-03 ENCOUNTER — APPOINTMENT (OUTPATIENT)
Dept: OTOLARYNGOLOGY | Facility: CLINIC | Age: 75
End: 2020-03-03

## 2020-03-10 ENCOUNTER — APPOINTMENT (OUTPATIENT)
Dept: OTOLARYNGOLOGY | Facility: CLINIC | Age: 75
End: 2020-03-10
Payer: MEDICARE

## 2020-03-10 VITALS
WEIGHT: 213 LBS | SYSTOLIC BLOOD PRESSURE: 126 MMHG | DIASTOLIC BLOOD PRESSURE: 81 MMHG | HEIGHT: 72 IN | BODY MASS INDEX: 28.85 KG/M2 | HEART RATE: 73 BPM

## 2020-03-10 PROCEDURE — 92557 COMPREHENSIVE HEARING TEST: CPT

## 2020-03-10 PROCEDURE — 31575 DIAGNOSTIC LARYNGOSCOPY: CPT

## 2020-03-10 PROCEDURE — 99214 OFFICE O/P EST MOD 30 MIN: CPT | Mod: 25

## 2020-03-10 PROCEDURE — 92567 TYMPANOMETRY: CPT

## 2020-03-10 NOTE — PHYSICAL EXAM
[Midline] : trachea located in midline position [Laryngoscopy Performed] : laryngoscopy was performed, see procedure section for findings [Normal] : no rashes [FreeTextEntry1] : Hoarse voice, somewhat improved.

## 2020-03-10 NOTE — REASON FOR VISIT
[Subsequent Evaluation] : a subsequent evaluation for [Family Member] : family member [FreeTextEntry2] : follow up after completion of radiation treatment 1/17/2020 for  SCCa glottis

## 2020-03-10 NOTE — PROCEDURE
[Unable to Cooperate with Mirror] : patient unable to cooperate with mirror [Hoarseness] : hoarseness not clearly evaluated by indirect laryngoscopy [Lesion] : lesion identified by mirror examination needing further evaluation [Topical Lidocaine] : topical lidocaine [Oxymetazoline HCl] : oxymetazoline HCl [Flexible Endoscope] : examined with the flexible endoscope [Serial Number: ___] : Serial Number: [unfilled] [True Vocal Cords Paralysis] : no true vocal cord paralysis [True Vocal Cords Erythematous] : no true vocal cord edema [True Vocal Cords Chowdary's Nodules] : no true vocal cord nodules [Glottis Arytenoid Cartilages] : no arytenoid granulomas [Glottis Arytenoid Cartilages Erythema] : no arytenoid erythema [Normal] : posterior cricoid area had healthy pink mucosa in the interarytenoid area and the esophageal inlet [Present] : absent [Lesion(s)] : no lesions

## 2020-03-10 NOTE — HISTORY OF PRESENT ILLNESS
[de-identified] : 74 year old male follow up after completion of radiation treatment 1/17/2020 for  SCCa glottis.  States no problems eating.  States sometimes foods get stuck in his throat.  Denies odynophagia.  Pt continues to c/o R ear discomfort.  \par \par

## 2020-03-10 NOTE — CONSULT LETTER
[Dear  ___] : Dear  [unfilled], [Courtesy Letter:] : I had the pleasure of seeing your patient, [unfilled], in my office today. [Please see my note below.] : Please see my note below. [Sincerely,] : Sincerely, [FreeTextEntry2] : Yash Lowe MD [FreeTextEntry3] : Louis Garduno MD, FACS\par Clinical \par Dept. of Otolaryngology\par [facility]\par

## 2020-05-26 ENCOUNTER — APPOINTMENT (OUTPATIENT)
Dept: OTOLARYNGOLOGY | Facility: CLINIC | Age: 75
End: 2020-05-26
Payer: MEDICARE

## 2020-05-26 VITALS — HEIGHT: 72 IN | BODY MASS INDEX: 29.12 KG/M2 | WEIGHT: 215 LBS

## 2020-05-26 VITALS — TEMPERATURE: 98.5 F

## 2020-05-26 DIAGNOSIS — Z92.3 PERSONAL HISTORY OF IRRADIATION: ICD-10-CM

## 2020-05-26 PROCEDURE — 99213 OFFICE O/P EST LOW 20 MIN: CPT | Mod: 25

## 2020-05-26 PROCEDURE — 31575 DIAGNOSTIC LARYNGOSCOPY: CPT

## 2020-05-26 RX ORDER — 70%ISOPROPYL ALCOHOL 0.7 ML/ML
70 SWAB TOPICAL
Refills: 0 | Status: COMPLETED | COMMUNITY
End: 2020-05-26

## 2020-05-26 NOTE — PHYSICAL EXAM
[Midline] : trachea located in midline position [Laryngoscopy Performed] : laryngoscopy was performed, see procedure section for findings [Normal] : no rashes [FreeTextEntry1] : Voice continues to improve

## 2020-05-26 NOTE — ASSESSMENT
[FreeTextEntry1] : Pt remains PASTOR.  He will see Dr. Posey in 6 weeks and will see me in 3 months.

## 2020-05-26 NOTE — HISTORY OF PRESENT ILLNESS
[de-identified] : 74 year male follow up after completion of radiation treatment 1/17/2020 for SCCa glottis. Denies dysphagia, odynophagia, dyspnea, dysphonia. States right ear fullness. Denies otalgia, otorrhea, tinnitus, dizziness.

## 2020-05-26 NOTE — CONSULT LETTER
[Dear  ___] : Dear  [unfilled], [Courtesy Letter:] : I had the pleasure of seeing your patient, [unfilled], in my office today. [Please see my note below.] : Please see my note below. [Sincerely,] : Sincerely, [FreeTextEntry2] : Yash Lowe MD [DrAlexandra  ___] : Dr. DENNY [FreeTextEntry3] : Louis Garduno MD, FACS\par Chief of Otolaryngology [facility]\par  - Dept. of Otolaryngology\par [facility] [employer]\par \par \par

## 2020-05-26 NOTE — PROCEDURE
[Unable to Cooperate with Mirror] : patient unable to cooperate with mirror [Lesion] : lesion identified by mirror examination needing further evaluation [Topical Lidocaine] : topical lidocaine [Oxymetazoline HCl] : oxymetazoline HCl [Flexible Endoscope] : examined with the flexible endoscope [Serial Number: ___] : Serial Number: [unfilled] [True Vocal Cords Chowdary's Nodules] : no true vocal cord nodules [True Vocal Cords Erythematous] : no true vocal cord edema [True Vocal Cords Paralysis] : no true vocal cord paralysis [Glottis Arytenoid Cartilages] : no arytenoid ulcerations [Normal] : posterior cricoid area had healthy pink mucosa in the interarytenoid area and the esophageal inlet [Glottis Arytenoid Cartilages Erythema] : no arytenoid erythema

## 2020-07-09 ENCOUNTER — APPOINTMENT (OUTPATIENT)
Dept: RADIATION ONCOLOGY | Facility: CLINIC | Age: 75
End: 2020-07-09
Payer: MEDICARE

## 2020-07-09 PROCEDURE — 99214 OFFICE O/P EST MOD 30 MIN: CPT | Mod: 95,GC

## 2020-07-09 NOTE — REASON FOR VISIT
[Post-Treatment Evaluation] : post-treatment evaluation for [Head and Neck Cancer] : head and neck cancer [Home] : at home, [unfilled] , at the time of the visit. [Medical Office: ([facility])___] : at the medical office located in  [Verbal consent obtained from patient] : the patient, [unfilled]

## 2020-07-13 NOTE — HISTORY OF PRESENT ILLNESS
[FreeTextEntry1] : Phone call f/u for 30 min due to COVID 19 crisis\par \par Piyush Valentin is a 75 y/o male without significant PMH and T1N0 glottis SCC who received 63 Gy/28fx from 12/9/19-1/17/20.  \par \par He initially presented with c/o voice change, then saw ENT physician. Biopsy pathology in 10/2019 showed glottis SCC. \par \par Saw Dr. Garduno in May 2020, PASTOR. No recent imaging.\par \par He states that he has healed well from the radiation treatment.  He denies pain. His voice has returned to baseline.  Denies odynophagia and dysphagia. He has a new nasal drip (occurs twice a week) that started after the completion of RT.  The nasal drip is not bothering him.

## 2020-07-13 NOTE — REVIEW OF SYSTEMS
[Negative] : Allergic/Immunologic [Constipation: Grade 0] : Constipation: Grade 0 [Anal Pain: Grade 0] : Anal Pain: Grade 0 [Diarrhea: Grade 0] : Diarrhea: Grade 0 [Dyspepsia: Grade 0] : Dyspepsia: Grade 0 [Esophagitis: Grade 0] : Esophagitis: Grade 0 [Dysphagia: Grade 0] : Dysphagia: Grade 0 [Nausea: Grade 0] : Nausea: Grade 0 [Gastroparesis: Grade 0] : Gastroparesis: Grade 0 [Fecal Incontinence: Grade 0] : Fecal Incontinence: Grade 0 [Proctitis: Grade 0] : Proctitis: Grade 0 [Rectal Pain: Grade 0] : Rectal Pain: Grade 0 [Small Intestinal Obstruction: Grade 0] : Small Intestinal Obstruction: Grade 0 [Vomiting: Grade 0] : Vomiting: Grade 0 [Tinnitus - Grade 0] : Tinnitus - Grade 0 [Mucositis Oral: Grade 0] : Mucositis Oral: Grade 0  [Blurred Vision: Grade 0] : Blurred Vision: Grade 0 [Xerostomia: Grade 0] : Xerostomia: Grade 0 [Oral Pain: Grade 0] : Oral Pain: Grade 0 [Salivary duct inflammation: Grade 0] : Salivary duct inflammation: Grade 0 [Dysgeusia: Grade 0] : Dysgeusia: Grade 0 [Cough: Grade 0] : Cough: Grade 0 [Dyspnea: Grade 0] : Dyspnea: Grade 0 [Hiccups: Grade 0] : Hiccups: Grade 0 [Hoarseness: Grade 0] : Hoarseness: Grade 0 [Hypoxia: Grade 0] : Hypoxia: Grade 0 [Pharyngeal Mucositis: Grade 0] : Pharyngeal Mucositis: Grade 0 [Pneumonitis: Grade 0] : Pneumonitis: Grade 0 [Voice Alteration: Grade 0] : Voice Alteration: Grade 0 [Pruritus: Grade 0] : Pruritus: Grade 0 [Alopecia: Grade 0] : Alopecia: Grade 0 [Skin Hyperpigmentation: Grade 0] : Skin Hyperpigmentation: Grade 0 [Skin Atrophy: Grade 0] : Skin Atrophy: Grade 0 [Skin Induration: Grade 0] : Skin Induration: Grade 0 [Dermatitis Radiation: Grade 0] : Dermatitis Radiation: Grade 0 [FreeTextEntry4] : h/o radiation

## 2020-08-25 ENCOUNTER — APPOINTMENT (OUTPATIENT)
Dept: OTOLARYNGOLOGY | Facility: CLINIC | Age: 75
End: 2020-08-25
Payer: MEDICARE

## 2020-08-25 VITALS
BODY MASS INDEX: 26.41 KG/M2 | DIASTOLIC BLOOD PRESSURE: 73 MMHG | HEIGHT: 72 IN | SYSTOLIC BLOOD PRESSURE: 114 MMHG | HEART RATE: 65 BPM | WEIGHT: 195 LBS

## 2020-08-25 DIAGNOSIS — H90.5 UNSPECIFIED SENSORINEURAL HEARING LOSS: ICD-10-CM

## 2020-08-25 PROCEDURE — 99214 OFFICE O/P EST MOD 30 MIN: CPT | Mod: 25

## 2020-08-25 PROCEDURE — 31575 DIAGNOSTIC LARYNGOSCOPY: CPT

## 2020-08-25 NOTE — PHYSICAL EXAM
[FreeTextEntry1] : Voice continues to improve [Midline] : trachea located in midline position [Laryngoscopy Performed] : laryngoscopy was performed, see procedure section for findings [Normal] : no rashes

## 2020-08-25 NOTE — PROCEDURE
[Unable to Cooperate with Mirror] : patient unable to cooperate with mirror [Hoarseness] : hoarseness not clearly evaluated by indirect laryngoscopy [Topical Lidocaine] : topical lidocaine [Lesion] : lesion identified by mirror examination needing further evaluation [Oxymetazoline HCl] : oxymetazoline HCl [Flexible Endoscope] : examined with the flexible endoscope [Serial Number: ___] : Serial Number: [unfilled] [True Vocal Cords Paralysis] : no true vocal cord paralysis [True Vocal Cords Erythematous] : no true vocal cord edema [True Vocal Cords Chowdary's Nodules] : no true vocal cord nodules [Glottis Arytenoid Cartilages] : no arytenoid granulomas [Glottis Arytenoid Cartilages Erythema] : no arytenoid erythema [Normal] : posterior cricoid area had healthy pink mucosa in the interarytenoid area and the esophageal inlet

## 2020-08-25 NOTE — HISTORY OF PRESENT ILLNESS
[de-identified] : 75 year male follow up after completion of radiation treatment January 2020 for SCCa glottis. Denies dysphagia, odynophagia, dyspnea, dysphonia. Pt continues to have a pressure sensation in his right ear.

## 2020-10-27 ENCOUNTER — APPOINTMENT (OUTPATIENT)
Dept: OTOLARYNGOLOGY | Facility: CLINIC | Age: 75
End: 2020-10-27
Payer: MEDICARE

## 2020-10-27 VITALS
HEART RATE: 61 BPM | DIASTOLIC BLOOD PRESSURE: 80 MMHG | BODY MASS INDEX: 26.41 KG/M2 | HEIGHT: 72 IN | SYSTOLIC BLOOD PRESSURE: 155 MMHG | WEIGHT: 195 LBS

## 2020-10-27 PROCEDURE — 31575 DIAGNOSTIC LARYNGOSCOPY: CPT

## 2020-10-27 PROCEDURE — 99214 OFFICE O/P EST MOD 30 MIN: CPT | Mod: 25

## 2020-10-27 NOTE — CONSULT LETTER
[Dear  ___] : Dear  [unfilled], [Courtesy Letter:] : I had the pleasure of seeing your patient, [unfilled], in my office today. [Please see my note below.] : Please see my note below. [Consult Closing:] : Thank you very much for allowing me to participate in the care of this patient.  If you have any questions, please do not hesitate to contact me. [Sincerely,] : Sincerely, [FreeTextEntry2] : Yash Lowe MD [FreeTextEntry3] : Louis Garduno MD, FACS\par Chief of Otolaryngology at [facility]\par \par Dept. of Otolaryngology\par [facility]  [DrAlexandra  ___] : Dr. DENNY

## 2020-10-27 NOTE — HISTORY OF PRESENT ILLNESS
[de-identified] : 75 year old male follow up for laryngeal carcinoma, s/p RT Jan 2020, history of SNHL and ear discomfort. Patient reports no changes in symptoms since last visit. Denies throat pain and dysphagia.  Reports no otalgia, ear pressure, otorrhea, changes with hearing and recent fevers.

## 2020-10-27 NOTE — REASON FOR VISIT
[Subsequent Evaluation] : a subsequent evaluation for [Family Member] : family member [FreeTextEntry2] : follow up for laryngeal carcinoma

## 2020-10-27 NOTE — PROCEDURE
[Unable to Cooperate with Mirror] : patient unable to cooperate with mirror [Lesion] : lesion identified by mirror examination needing further evaluation [Topical Lidocaine] : topical lidocaine [Oxymetazoline HCl] : oxymetazoline HCl [Flexible Endoscope] : examined with the flexible endoscope [Serial Number: ___] : Serial Number: [unfilled] [Normal] : the false vocal folds were pink and regular, the ventricular sulcus was open, the true vocal folds were glistening white, tense and of equal length, mobility, and height [True Vocal Cords Paralysis] : no true vocal cord paralysis [True Vocal Cords Erythematous] : bilateral true vocal cord edema [True Vocal Cords Chowdary's Nodules] : no true vocal cord nodules [Glottis Arytenoid Cartilages] : no arytenoid granulomas [Glottis Arytenoid Cartilages Erythema] : no arytenoid erythema [Interarytenoid Edema] : interarytenoid area edematous

## 2020-10-27 NOTE — PHYSICAL EXAM
[FreeTextEntry1] : Voice without change [Midline] : trachea located in midline position [Laryngoscopy Performed] : laryngoscopy was performed, see procedure section for findings [Normal] : no rashes

## 2020-11-02 ENCOUNTER — TRANSCRIPTION ENCOUNTER (OUTPATIENT)
Age: 75
End: 2020-11-02

## 2021-01-21 ENCOUNTER — APPOINTMENT (OUTPATIENT)
Dept: RADIATION ONCOLOGY | Facility: CLINIC | Age: 76
End: 2021-01-21
Payer: MEDICARE

## 2021-02-05 ENCOUNTER — APPOINTMENT (OUTPATIENT)
Dept: RADIATION ONCOLOGY | Facility: CLINIC | Age: 76
End: 2021-02-05
Payer: MEDICARE

## 2021-02-05 ENCOUNTER — NON-APPOINTMENT (OUTPATIENT)
Age: 76
End: 2021-02-05

## 2021-02-05 VITALS — WEIGHT: 210 LBS | RESPIRATION RATE: 16 BRPM | BODY MASS INDEX: 28.44 KG/M2 | HEIGHT: 72 IN

## 2021-02-05 PROCEDURE — 99214 OFFICE O/P EST MOD 30 MIN: CPT

## 2021-02-08 NOTE — PHYSICAL EXAM
[] : no respiratory distress [Normal] : normal heart rate and rhythm, normal S1 and S2, and no murmurs present [Cervical Lymph Nodes Enlarged Posterior Bilaterally] : posterior cervical [Cervical Lymph Nodes Enlarged Anterior Bilaterally] : anterior cervical [Supraclavicular Lymph Nodes Enlarged Bilaterally] : supraclavicular [No Focal Deficits] : no focal deficits [Oriented To Time, Place, And Person] : oriented to person, place, and time [FreeTextEntry1] : telephone appointment

## 2021-02-08 NOTE — HISTORY OF PRESENT ILLNESS
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  \par \par He initially presented with c/o voice change, then saw ENT physician. Biopsy pathology in 10/2019 showed glottis SCC. \par \par Saw Dr. Garduno in May 2020, PASTOR. \par \par Patient has no recent imaging done. She follow up with Dr. Garduno on 10/27/20. Next follow up is in April.\par \par He states that he has healed well from the radiation treatment.  He denies pain. His voice has returned to baseline.  Denies odynophagia and dysphagia. He has a new nasal drip (occurs twice a week) that started after the completion of RT.  The nasal drip is not bothering him.

## 2021-05-04 ENCOUNTER — APPOINTMENT (OUTPATIENT)
Dept: OTOLARYNGOLOGY | Facility: CLINIC | Age: 76
End: 2021-05-04
Payer: MEDICARE

## 2021-05-04 VITALS — HEIGHT: 72 IN | BODY MASS INDEX: 27.63 KG/M2 | WEIGHT: 204 LBS

## 2021-05-04 PROCEDURE — 99214 OFFICE O/P EST MOD 30 MIN: CPT | Mod: 25

## 2021-05-04 PROCEDURE — 31575 DIAGNOSTIC LARYNGOSCOPY: CPT

## 2021-05-04 NOTE — PROCEDURE
[Unable to Cooperate with Mirror] : patient unable to cooperate with mirror [Lesion] : lesion identified by mirror examination needing further evaluation [Topical Lidocaine] : topical lidocaine [Oxymetazoline HCl] : oxymetazoline HCl [Flexible Endoscope] : examined with the flexible endoscope [Serial Number: ___] : Serial Number: [unfilled] [True Vocal Cords Paralysis] : no true vocal cord paralysis [True Vocal Cords Erythematous] : no true vocal cord edema [True Vocal Cords Chowdary's Nodules] : no true vocal cord nodules [Glottis Arytenoid Cartilages] : no arytenoid granulomas [Glottis Arytenoid Cartilages Erythema] : no arytenoid erythema [Normal] : posterior cricoid area had healthy pink mucosa in the interarytenoid area and the esophageal inlet

## 2021-05-04 NOTE — REASON FOR VISIT
[Subsequent Evaluation] : a subsequent evaluation for [FreeTextEntry2] : follow up for laryngeal carcinoma

## 2021-05-04 NOTE — CONSULT LETTER
[Dear  ___] : Dear  [unfilled], [Courtesy Letter:] : I had the pleasure of seeing your patient, [unfilled], in my office today. [Please see my note below.] : Please see my note below. [Sincerely,] : Sincerely, [FreeTextEntry2] : Yash Lowe MD \par  [FreeTextEntry3] : Louis Garduno MD, FACS\par Chief of Otolaryngology at [facility]\par \par Dept. of Otolaryngology\par [facility]\par  [DrAlexandra  ___] : Dr. DENNY

## 2021-05-04 NOTE — HISTORY OF PRESENT ILLNESS
[de-identified] : 75 year old male follow up for laryngeal carcinoma, s/p RT Jan 2020.  States no changes in symptoms since last visit. Denies throat pain and dysphagia.

## 2021-05-04 NOTE — ASSESSMENT
[FreeTextEntry1] : Pt remains PASTOR.  He will see Dr. Posey in September and will see me in January.

## 2021-07-15 ENCOUNTER — APPOINTMENT (OUTPATIENT)
Dept: RADIATION ONCOLOGY | Facility: CLINIC | Age: 76
End: 2021-07-15
Payer: MEDICARE

## 2021-07-15 VITALS
SYSTOLIC BLOOD PRESSURE: 145 MMHG | RESPIRATION RATE: 17 BRPM | HEART RATE: 56 BPM | WEIGHT: 206.02 LBS | BODY MASS INDEX: 27.94 KG/M2 | OXYGEN SATURATION: 100 % | DIASTOLIC BLOOD PRESSURE: 79 MMHG

## 2021-07-15 PROCEDURE — 99214 OFFICE O/P EST MOD 30 MIN: CPT | Mod: 25,GC

## 2021-07-15 NOTE — PHYSICAL EXAM
[Sclera] : the sclera and conjunctiva were normal [Outer Ear] : the ears and nose were normal in appearance [] : no respiratory distress [Normal] : no palpable adenopathy [Cervical Lymph Nodes Enlarged Posterior Bilaterally] : posterior cervical [Cervical Lymph Nodes Enlarged Anterior Bilaterally] : anterior cervical [Supraclavicular Lymph Nodes Enlarged Bilaterally] : supraclavicular [No Focal Deficits] : no focal deficits [Oriented To Time, Place, And Person] : oriented to person, place, and time

## 2021-07-19 NOTE — HISTORY OF PRESENT ILLNESS
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  He initially presented with c/o voice change, then saw ENT physician. Biopsy pathology in 10/2019 showed glottis SCC. \par \par Saw Dr. Garduno in May 2021, PASTOR. No interval imaging. \par \par He states that he has healed well from the radiation treatment. He denies pain. His voice has returned to baseline.  Denies odynophagia and dysphagia.

## 2021-07-19 NOTE — PROCEDURE
[Surveillance] : monitor for disease recurrence [Normal] : the true vocal cords ~T were normal [Mass ___ cm] : no masses on the base of the tongue [Lesion(s)] : no lesions

## 2021-07-19 NOTE — REVIEW OF SYSTEMS
[Negative] : Allergic/Immunologic [Anal Pain: Grade 0] : Anal Pain: Grade 0 [Constipation: Grade 0] : Constipation: Grade 0 [Diarrhea: Grade 0] : Diarrhea: Grade 0 [Dyspepsia: Grade 0] : Dyspepsia: Grade 0 [Dysphagia: Grade 0] : Dysphagia: Grade 0 [Esophagitis: Grade 0] : Esophagitis: Grade 0 [Fecal Incontinence: Grade 0] : Fecal Incontinence: Grade 0 [Gastroparesis: Grade 0] : Gastroparesis: Grade 0 [Nausea: Grade 0] : Nausea: Grade 0 [Proctitis: Grade 0] : Proctitis: Grade 0 [Rectal Pain: Grade 0] : Rectal Pain: Grade 0 [Small Intestinal Obstruction: Grade 0] : Small Intestinal Obstruction: Grade 0 [Vomiting: Grade 0] : Vomiting: Grade 0 [Tinnitus - Grade 0] : Tinnitus - Grade 0 [Blurred Vision: Grade 0] : Blurred Vision: Grade 0 [Mucositis Oral: Grade 0] : Mucositis Oral: Grade 0  [Xerostomia: Grade 0] : Xerostomia: Grade 0 [Oral Pain: Grade 0] : Oral Pain: Grade 0 [Salivary duct inflammation: Grade 0] : Salivary duct inflammation: Grade 0 [Dysgeusia: Grade 0] : Dysgeusia: Grade 0 [Cough: Grade 0] : Cough: Grade 0 [Dyspnea: Grade 0] : Dyspnea: Grade 0 [Hiccups: Grade 0] : Hiccups: Grade 0 [Hoarseness: Grade 0] : Hoarseness: Grade 0 [Hypoxia: Grade 0] : Hypoxia: Grade 0 [Pharyngeal Mucositis: Grade 0] : Pharyngeal Mucositis: Grade 0 [Pneumonitis: Grade 0] : Pneumonitis: Grade 0 [Voice Alteration: Grade 0] : Voice Alteration: Grade 0 [Alopecia: Grade 0] : Alopecia: Grade 0 [Pruritus: Grade 0] : Pruritus: Grade 0 [Skin Atrophy: Grade 0] : Skin Atrophy: Grade 0 [Skin Hyperpigmentation: Grade 0] : Skin Hyperpigmentation: Grade 0 [Skin Induration: Grade 0] : Skin Induration: Grade 0 [Dermatitis Radiation: Grade 0] : Dermatitis Radiation: Grade 0 [FreeTextEntry4] : h/o radiation

## 2021-10-14 ENCOUNTER — APPOINTMENT (OUTPATIENT)
Dept: RADIATION ONCOLOGY | Facility: CLINIC | Age: 76
End: 2021-10-14
Payer: MEDICARE

## 2021-10-14 VITALS
OXYGEN SATURATION: 100 % | DIASTOLIC BLOOD PRESSURE: 79 MMHG | TEMPERATURE: 97.34 F | HEART RATE: 59 BPM | SYSTOLIC BLOOD PRESSURE: 164 MMHG | WEIGHT: 201.94 LBS | BODY MASS INDEX: 27.39 KG/M2 | RESPIRATION RATE: 17 BRPM

## 2021-10-14 PROCEDURE — 99213 OFFICE O/P EST LOW 20 MIN: CPT

## 2021-10-14 RX ORDER — DOCUSATE SODIUM 100 MG
100 TABLET ORAL
Refills: 0 | Status: ACTIVE | COMMUNITY

## 2021-10-14 NOTE — PHYSICAL EXAM
[Normal] : the ears, nose and oropharynx were normal in appearance [Outer Ear] : the ears and nose were normal in appearance [Examination Of The Oral Cavity] : the lips and gums were normal [Both Tympanic Membranes Were Examined] : both tympanic membranes were normal [Oropharynx] : The oropharynx was normal

## 2021-10-18 NOTE — HISTORY OF PRESENT ILLNESS
[FreeTextEntry1] : Piyush Valentin is a 75 y/o male without significant PMH and T1N0 glottis SCC who received 63 Gy/28fx from 12/9/19-1/17/20 with Dr. Posey.  He initially presented with c/o voice change, then saw ENT physician. Biopsy pathology in 10/2019 showed glottis SCC. \par \par Today reports feeling well. Denies any dysphagia or any issues with taste. Has appointment with Dr. Garduno 1/04/22.

## 2022-01-04 ENCOUNTER — APPOINTMENT (OUTPATIENT)
Dept: OTOLARYNGOLOGY | Facility: CLINIC | Age: 77
End: 2022-01-04
Payer: MEDICARE

## 2022-01-04 VITALS
HEART RATE: 58 BPM | OXYGEN SATURATION: 100 % | BODY MASS INDEX: 27.8 KG/M2 | DIASTOLIC BLOOD PRESSURE: 81 MMHG | WEIGHT: 205 LBS | SYSTOLIC BLOOD PRESSURE: 160 MMHG

## 2022-01-04 PROCEDURE — 31575 DIAGNOSTIC LARYNGOSCOPY: CPT

## 2022-01-04 PROCEDURE — 99213 OFFICE O/P EST LOW 20 MIN: CPT | Mod: 25

## 2022-01-04 RX ORDER — CARBIDOPA 25 MG/1
TABLET ORAL
Refills: 0 | Status: COMPLETED | COMMUNITY
End: 2022-01-04

## 2022-01-04 NOTE — ASSESSMENT
[FreeTextEntry1] : Pt remains PASTOR.  He will see Dr. Bahena in June and will f/u here in December.

## 2022-01-04 NOTE — CONSULT LETTER
[Dear  ___] : Dear  [unfilled], [Courtesy Letter:] : I had the pleasure of seeing your patient, [unfilled], in my office today. [Please see my note below.] : Please see my note below. [Sincerely,] : Sincerely, [FreeTextEntry2] : Yash Lowe MD  [FreeTextEntry3] : Louis Garduno MD, FACS\par Chief of Otolaryngology at \par \par Dept. of Otolaryngology\par [facility]\par  [DrAlexandra  ___] : Dr. DENNY

## 2022-01-04 NOTE — HISTORY OF PRESENT ILLNESS
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 States no changes in symptoms since last visit. Denies throat pain and dysphagia.  \par

## 2022-03-23 ENCOUNTER — NON-APPOINTMENT (OUTPATIENT)
Age: 77
End: 2022-03-23

## 2022-04-13 NOTE — VITALS
[Least Pain Intensity: 0/10] : 0/10 [Maximal Pain Intensity: 0/10] : 0/10 [ECOG Performance Status: 2 - Ambulatory and capable of all self care but unable to carry out any work activities] : Performance Status: 2 - Ambulatory and capable of all self care but unable to carry out any work activities. Up and about more than 50% of waking hours [80: Normal activity with effort; some signs or symptoms of disease.] : 80: Normal activity with effort; some signs or symptoms of disease.  Estimated Blood Loss (Cc): minimal

## 2022-06-09 ENCOUNTER — APPOINTMENT (OUTPATIENT)
Dept: RADIATION ONCOLOGY | Facility: CLINIC | Age: 77
End: 2022-06-09
Payer: MEDICARE

## 2022-06-09 VITALS
WEIGHT: 197.31 LBS | SYSTOLIC BLOOD PRESSURE: 161 MMHG | RESPIRATION RATE: 17 BRPM | OXYGEN SATURATION: 80 % | BODY MASS INDEX: 26.76 KG/M2 | TEMPERATURE: 97.52 F | HEART RATE: 240 BPM | DIASTOLIC BLOOD PRESSURE: 88 MMHG

## 2022-06-09 PROCEDURE — 99213 OFFICE O/P EST LOW 20 MIN: CPT

## 2022-06-09 RX ORDER — CARBIDOPA AND LEVODOPA 25; 100 MG/1; MG/1
25-100 TABLET ORAL
Qty: 90 | Refills: 0 | Status: DISCONTINUED | COMMUNITY
Start: 2021-11-30 | End: 2022-06-09

## 2022-06-09 NOTE — REVIEW OF SYSTEMS
[Dysphagia: Grade 0] : Dysphagia: Grade 0 [Fatigue: Grade 0] : Fatigue: Grade 0 [Mucositis Oral: Grade 0] : Mucositis Oral: Grade 0  [Xerostomia: Grade 0] : Xerostomia: Grade 0 [Oral Pain: Grade 0] : Oral Pain: Grade 0 [Dysgeusia: Grade 0] : Dysgeusia: Grade 0 [Hoarseness: Grade 0] : Hoarseness: Grade 0 [Skin Hyperpigmentation: Grade 0] : Skin Hyperpigmentation: Grade 0 [Dermatitis Radiation: Grade 0] : Dermatitis Radiation: Grade 0

## 2022-06-13 NOTE — HISTORY OF PRESENT ILLNESS
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  He initially presented with c/o voice change, then saw ENT physician. Biopsy pathology in 10/2019 showed glottis SCC. \par \par Saw Dr. Garduno in 1/22 , PASTOR on scope. No interval imaging. \par \par He states that he has healed well from the radiation treatment. He denies pain. His voice has returned to baseline.  Denies odynophagia and dysphagia. had some hemoptysis in march.

## 2022-12-06 ENCOUNTER — APPOINTMENT (OUTPATIENT)
Dept: OTOLARYNGOLOGY | Facility: CLINIC | Age: 77
End: 2022-12-06

## 2022-12-23 NOTE — H&P PST ADULT - NSANTHBMIRD_ENT_A_CORE
----- Message from Antoinette Rodriguez sent at 12/23/2022 12:03 PM CST -----  Contact: Erma Ritchie would like a call back at [phone], in regards to making sure her message she was sent over in the portal was seen to reschedule procedure to after the new year.  Thanks   Am     
Returned patient call. Inform her that I tried to reach endo to reschedule her appt for bronchoscopy after 1/1/23 but didn't get an answer. Will call endo again on 12/27/22 to reschedule pt appt.   
No

## 2023-06-22 ENCOUNTER — APPOINTMENT (OUTPATIENT)
Dept: RADIATION ONCOLOGY | Facility: CLINIC | Age: 78
End: 2023-06-22
Payer: MEDICARE

## 2023-06-22 VITALS
HEIGHT: 72 IN | TEMPERATURE: 97.34 F | DIASTOLIC BLOOD PRESSURE: 92 MMHG | HEART RATE: 52 BPM | OXYGEN SATURATION: 100 % | SYSTOLIC BLOOD PRESSURE: 165 MMHG | BODY MASS INDEX: 23.7 KG/M2 | WEIGHT: 175 LBS | RESPIRATION RATE: 17 BRPM

## 2023-06-22 PROCEDURE — 99213 OFFICE O/P EST LOW 20 MIN: CPT

## 2023-06-22 NOTE — HISTORY OF PRESENT ILLNESS
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  He initially presented with c/o voice change, then saw ENT physician. Biopsy pathology in 10/2019 showed glottis SCC. \par \par Saw Dr. Garduno in 1/22 , PASTOR on scope. No interval imaging. \par \par Presents for follow up

## 2023-07-25 ENCOUNTER — APPOINTMENT (OUTPATIENT)
Dept: OTOLARYNGOLOGY | Facility: CLINIC | Age: 78
End: 2023-07-25
Payer: MEDICARE

## 2023-07-25 PROCEDURE — 99214 OFFICE O/P EST MOD 30 MIN: CPT | Mod: 25

## 2023-07-25 PROCEDURE — 31575 DIAGNOSTIC LARYNGOSCOPY: CPT

## 2023-07-25 NOTE — CONSULT LETTER
[Dear  ___] : Dear  [unfilled], [Courtesy Letter:] : I had the pleasure of seeing your patient, [unfilled], in my office today. [Please see my note below.] : Please see my note below. [Sincerely,] : Sincerely, [DrAlexandra  ___] : Dr. DENNY [FreeTextEntry2] : Yash Lowe MD  [FreeTextEntry3] : Uma Paulino PA-C \par Department of Otolaryngology\par [facility]\par Otolaryngology at [place]\par [address],\[place] [address]\par \par \par Louis Garduno MD, FACS \par Chief of Otolaryngology at [facility] \par  \par Dept. of Otolaryngology \par [facility]

## 2023-07-25 NOTE — REASON FOR VISIT
[Subsequent Evaluation] : a subsequent evaluation for [Friend] : friend [FreeTextEntry2] : laryngeal cancer

## 2023-07-25 NOTE — PROCEDURE
[Unable to Cooperate with Mirror] : patient unable to cooperate with mirror [None] : none [Flexible Endoscope] : examined with the flexible endoscope [Serial Number: ___] : Serial Number: [unfilled] [True Vocal Cords Paralysis] : no true vocal cord paralysis [True Vocal Cords Erythematous] : no true vocal cord edema [True Vocal Cords Chowdary's Nodules] : no true vocal cord nodules [Glottis Arytenoid Cartilages] : no arytenoid granulomas [Glottis Arytenoid Cartilages Erythema] : no arytenoid erythema [Normal] : posterior cricoid area had healthy pink mucosa in the interarytenoid area and the esophageal inlet [de-identified] : Surgilube

## 2023-07-25 NOTE — HISTORY OF PRESENT ILLNESS
[de-identified] : 79 y/o M presents as a follow up for laryngeal carcinoma s/p RT 01/2020. \par Patient states that he has been well since last visit. Reports that sometimes if he eats too much food it will get stuck in throat and he will have to cough it up. \par \par Patient tolerating diet. Denies fever, chills, malaise, unintentional weight loss, night sweats, xerostomia, odynophagia, dyspnea, dysphonia, cough, hemoptysis, globus.

## 2024-01-02 ENCOUNTER — APPOINTMENT (OUTPATIENT)
Dept: OTOLARYNGOLOGY | Facility: CLINIC | Age: 79
End: 2024-01-02
Payer: MEDICARE

## 2024-01-02 VITALS
HEIGHT: 72 IN | DIASTOLIC BLOOD PRESSURE: 79 MMHG | WEIGHT: 164 LBS | BODY MASS INDEX: 22.21 KG/M2 | HEART RATE: 69 BPM | SYSTOLIC BLOOD PRESSURE: 132 MMHG

## 2024-01-02 DIAGNOSIS — C32.9 MALIGNANT NEOPLASM OF LARYNX, UNSPECIFIED: ICD-10-CM

## 2024-01-02 DIAGNOSIS — H92.09 OTALGIA, UNSPECIFIED EAR: ICD-10-CM

## 2024-01-02 PROCEDURE — 31575 DIAGNOSTIC LARYNGOSCOPY: CPT

## 2024-01-02 PROCEDURE — 99213 OFFICE O/P EST LOW 20 MIN: CPT | Mod: 25

## 2024-01-02 RX ORDER — LEVODOPA
POWDER (GRAM) MISCELLANEOUS
Refills: 0 | Status: ACTIVE | COMMUNITY

## 2024-01-02 NOTE — HISTORY OF PRESENT ILLNESS
[de-identified] : 78M presents as a follow up for SSCa of larynx s/p RT 01/17/2020. Tolerating regular diet with solids and liquids. Denies fever, chills, unintentional weight loss, trismus, xerostomia, odynophagia, dysphagia, dyspnea, dysphonia, cough, globus, hearing loss.

## 2024-01-02 NOTE — PHYSICAL EXAM
[Midline] : trachea located in midline position [Normal] : no rashes [de-identified] : radiation changes

## 2024-01-02 NOTE — ASSESSMENT
[FreeTextEntry1] : Patient remains PASTOR.  Follow up with Dr. Bahena in 6 months for a recheck and will see me in a year.    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  Pt declined trial of Fluticasone spray.

## 2024-01-02 NOTE — CONSULT LETTER
[Dear  ___] : Dear  [unfilled], [Courtesy Letter:] : I had the pleasure of seeing your patient, [unfilled], in my office today. [Please see my note below.] : Please see my note below. [Sincerely,] : Sincerely, [DrAlexandra  ___] : Dr. DENNY [FreeTextEntry2] : Yash Lowe MD [FreeTextEntry3] : Uma Paulino PA-C  Department of Otolaryngology [facility] Otolaryngology at [address], [place] [address]   Louis Garduno MD, FACS  Chief of Otolaryngology at [facility]    Dept. of Otolaryngology  [facility]

## 2024-01-02 NOTE — PROCEDURE
[Flexible Endoscope] : examined with the flexible endoscope [Serial Number: ___] : Serial Number: [unfilled] [Normal] : posterior cricoid area had healthy pink mucosa in the interarytenoid area and the esophageal inlet [Unable to Cooperate with Mirror] : patient unable to cooperate with mirror [Lesion] : lesion identified by mirror examination needing further evaluation [None] : none [True Vocal Cords Paralysis] : no true vocal cord paralysis [True Vocal Cords Erythematous] : no true vocal cord edema [True Vocal Cords Chowdary's Nodules] : no true vocal cord nodules [Glottis Arytenoid Cartilages] : no arytenoid granulomas [Glottis Arytenoid Cartilages Erythema] : no arytenoid erythema

## 2024-05-08 NOTE — ASU PATIENT PROFILE, ADULT - PAIN SCALE PREFERRED, PROFILE
Detail Level: Detailed Quality 137: Melanoma: Continuity Of Care - Recall System: Patient information entered into a recall system that includes: target date for the next exam specified AND a process to follow up with patients regarding missed or unscheduled appointments When Should The Patient Follow-Up For Their Next Full-Body Skin Exam?: 6 Months Detail Level: Zone numerical 0-10

## 2024-06-24 ENCOUNTER — APPOINTMENT (OUTPATIENT)
Dept: RADIATION ONCOLOGY | Facility: CLINIC | Age: 79
End: 2024-06-24
Payer: MEDICARE

## 2024-06-24 VITALS
WEIGHT: 163.14 LBS | OXYGEN SATURATION: 100 % | HEART RATE: 57 BPM | RESPIRATION RATE: 18 BRPM | TEMPERATURE: 97 F | SYSTOLIC BLOOD PRESSURE: 127 MMHG | DIASTOLIC BLOOD PRESSURE: 72 MMHG | BODY MASS INDEX: 22.1 KG/M2 | HEIGHT: 72 IN

## 2024-06-24 PROCEDURE — 99213 OFFICE O/P EST LOW 20 MIN: CPT

## 2024-06-24 NOTE — HISTORY OF PRESENT ILLNESS
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  He initially presented with c/o voice change, then saw ENT physician. Biopsy pathology in 10/2019 showed glottis SCC.   Followed by Dr. Garduno last visit 1/2/2024. Exam PASTOR.   Presents for follow up

## 2024-12-09 NOTE — ASU PATIENT PROFILE, ADULT - TEACHING/LEARNING FACTORS INFLUENCE READINESS TO LEARN
pregnant patients, recommended with each pregnancy.   04/06/2016   • COVID-19 Vaccine (6 - 2024-25 season) 09/01/[address] [place] [address]  Phone: [phone]  Web Address: [email or web address]_[email or web address]    [place] Food and Drug Administration  [address] [place] [address]  Phone: [phone]  D.A[employer]M., Inc. or [employer]       none

## 2025-01-07 ENCOUNTER — APPOINTMENT (OUTPATIENT)
Dept: OTOLARYNGOLOGY | Facility: CLINIC | Age: 80
End: 2025-01-07
Payer: MEDICARE

## 2025-01-07 VITALS
DIASTOLIC BLOOD PRESSURE: 89 MMHG | WEIGHT: 155 LBS | SYSTOLIC BLOOD PRESSURE: 151 MMHG | BODY MASS INDEX: 20.99 KG/M2 | OXYGEN SATURATION: 100 % | HEART RATE: 66 BPM | HEIGHT: 72 IN

## 2025-01-07 DIAGNOSIS — C32.9 MALIGNANT NEOPLASM OF LARYNX, UNSPECIFIED: ICD-10-CM

## 2025-01-07 DIAGNOSIS — R13.14 DYSPHAGIA, PHARYNGOESOPHAGEAL PHASE: ICD-10-CM

## 2025-01-07 DIAGNOSIS — R49.1 APHONIA: ICD-10-CM

## 2025-01-07 PROCEDURE — 31575 DIAGNOSTIC LARYNGOSCOPY: CPT

## 2025-01-07 PROCEDURE — 99214 OFFICE O/P EST MOD 30 MIN: CPT | Mod: 25

## 2025-04-02 NOTE — CONSULT LETTER
Pt reports abscess to lower rt abd for one week with drainage, no fever but area is getting worse.    [Dear  ___] : Dear  [unfilled], [Courtesy Letter:] : I had the pleasure of seeing your patient, [unfilled], in my office today. [Sincerely,] : Sincerely, [Please see my note below.] : Please see my note below. [FreeTextEntry2] : Yash Lowe MD [FreeTextEntry3] : Louis Garduno MD, FACS\par Chief of Otolaryngology at [facility]\par \par Dept. of Otolaryngology\par [facility]\par

## 2025-06-26 ENCOUNTER — APPOINTMENT (OUTPATIENT)
Dept: RADIATION ONCOLOGY | Facility: CLINIC | Age: 80
End: 2025-06-26